# Patient Record
Sex: MALE | Race: BLACK OR AFRICAN AMERICAN | NOT HISPANIC OR LATINO
[De-identification: names, ages, dates, MRNs, and addresses within clinical notes are randomized per-mention and may not be internally consistent; named-entity substitution may affect disease eponyms.]

---

## 2020-12-21 ENCOUNTER — RESULT REVIEW (OUTPATIENT)
Age: 60
End: 2020-12-21

## 2020-12-21 ENCOUNTER — APPOINTMENT (OUTPATIENT)
Dept: NEUROSURGERY | Facility: CLINIC | Age: 60
End: 2020-12-21
Payer: MEDICARE

## 2020-12-21 ENCOUNTER — OUTPATIENT (OUTPATIENT)
Dept: OUTPATIENT SERVICES | Facility: HOSPITAL | Age: 60
LOS: 1 days | End: 2020-12-21
Payer: MEDICARE

## 2020-12-21 VITALS
HEART RATE: 80 BPM | TEMPERATURE: 97.3 F | WEIGHT: 183 LBS | SYSTOLIC BLOOD PRESSURE: 119 MMHG | OXYGEN SATURATION: 98 % | DIASTOLIC BLOOD PRESSURE: 77 MMHG | HEIGHT: 70 IN | BODY MASS INDEX: 26.2 KG/M2

## 2020-12-21 DIAGNOSIS — Z85.46 PERSONAL HISTORY OF MALIGNANT NEOPLASM OF PROSTATE: ICD-10-CM

## 2020-12-21 PROCEDURE — 72050 X-RAY EXAM NECK SPINE 4/5VWS: CPT

## 2020-12-21 PROCEDURE — 99072 ADDL SUPL MATRL&STAF TM PHE: CPT

## 2020-12-21 PROCEDURE — 72050 X-RAY EXAM NECK SPINE 4/5VWS: CPT | Mod: 26

## 2020-12-21 PROCEDURE — 99205 OFFICE O/P NEW HI 60 MIN: CPT

## 2020-12-22 PROBLEM — Z85.46 HISTORY OF MALIGNANT NEOPLASM OF PROSTATE: Status: RESOLVED | Noted: 2020-12-21 | Resolved: 2020-12-22

## 2020-12-22 NOTE — REVIEW OF SYSTEMS
[As Noted in HPI] : as noted in HPI [Lightheadedness] : lightheadedness [Vertigo] : vertigo [Migraine Headache] : migraine headaches [Feelings Of Weakness] : feelings of weakness [Negative] : Heme/Lymph

## 2020-12-22 NOTE — HISTORY OF PRESENT ILLNESS
[de-identified] : 60 year old man with past medical history prostate cancer referred by Dr. Yang for neurosurgical evaluation of cervical spine.\par \par Mr. Montez states that he has suffered from chronic neck pain for about 15 years. Over this time, he states his symptoms have worsened. Pain involves his neck radiating to bilateral extremities (LEFT greater than RIGHT). He also reports intermittent numbness, particularly in his hands bilaterally. He reports weakness in his LEFT hand.\par \par He states pain is most severe with flexion/extension and rotation of his neck. For these reasons, he finds it difficult to participate in specific activities. He states even using the elliptical machine while working out can cause neck discomfort. \par He has done physical therapy in the past several times without relief of symptoms. He has not seen pain management nor had injections in his neck.\par \par He also reports flexion/extension of his neck also can cause vertigo and lightheadedness.\par \par His last MRI cervical spine which he brings with him for review today was done 10/20/2019.\par \par He does report occasional urinary incontinence, which he states began after his prostate surgery. He denies imbalance, lower extremity weakness/numbness/tingling/pain.

## 2020-12-22 NOTE — DATA REVIEWED
[de-identified] : MRI cervical spine without contrast 10/2/19:\par Impression: Multilevel degenerative changes with up to mild to moderate central canal stenosis at C2-C4 secondary to a central disc protrusion that indents the cord. There is also mild central canal stenosis at C4-5, C5-6, C6-7. There is greater left sided foraminal stenosis at C3-4 and C4-5 and more symmetric foraminal stenosis inferiorly. These findings are stable.

## 2020-12-22 NOTE — PHYSICAL EXAM
[General Appearance - Alert] : alert [General Appearance - In No Acute Distress] : in no acute distress [Oriented To Time, Place, And Person] : oriented to person, place, and time [Motor Tone] : muscle tone was normal in all four extremities [Motor Strength] : muscle strength was normal in all four extremities [Pain] : was painful [Sclera] : the sclera and conjunctiva were normal [Outer Ear] : the ears and nose were normal in appearance [Neck Appearance] : the appearance of the neck was normal [Abnormal Walk] : normal gait [Skin Color & Pigmentation] : normal skin color and pigmentation

## 2021-01-08 ENCOUNTER — APPOINTMENT (OUTPATIENT)
Dept: PHYSICAL MEDICINE AND REHAB | Facility: CLINIC | Age: 61
End: 2021-01-08
Payer: MEDICARE

## 2021-01-08 VITALS — WEIGHT: 183 LBS | RESPIRATION RATE: 17 BRPM | BODY MASS INDEX: 26.2 KG/M2 | HEIGHT: 70 IN

## 2021-01-08 DIAGNOSIS — M62.81 MUSCLE WEAKNESS (GENERALIZED): ICD-10-CM

## 2021-01-08 DIAGNOSIS — R29.898 OTHER SYMPTOMS AND SIGNS INVOLVING THE MUSCULOSKELETAL SYSTEM: ICD-10-CM

## 2021-01-08 PROCEDURE — 99204 OFFICE O/P NEW MOD 45 MIN: CPT | Mod: 95

## 2021-01-08 NOTE — ASSESSMENT
[FreeTextEntry1] : \par PLAN AND RECOMMENDATIONS :\par \par We discussed differential diagnosis and clinical impression\par agree with EMG pre op planning rule out CTS etc \par \par Recommend\par .symptomatic care and support\par  medications as per pain MD \par  imaging -done \par  PT no use has never helped him \par  hydrotherapy /heat / cold for pain\par  continue  ergonomic precautions including pacing ,posture and frequent breaks while typing.\par \par  relative rest and avoidance of painful activity where possible \par  increasing activity as discussed \par  return for EMG\par Information given to patient about EMG and Nerve Conduction Study Examination including  planning, differential diagnosis to rule in /rule out ,duration of the test ,precautions (if patient on blood thinner.has bleeding disorder or  pace maker device etc -still possible to undergo with care), side effects(benign-limited to short term bruising and discomfort/pain)  \par The protocol of temp checks upon arrival ,disinfection procedure of waiting room and the lab explained- reassured. \par All questions answered. \par Patient instructed to book appointment upon conclusion of appointment\par \par Information sheet ' Answers to your Questions on EMG " forwarded to patient to read prior to testing, with further information about training,background and the procedure itself .\par \par I certify that > 50 % of time spent was spent on counselling and coordination of care and more than 50% face to face directly \par Time spent including review of documents /records/decision making /discussion  amounted  > 45 minutes\par

## 2021-01-08 NOTE — PHYSICAL EXAM
[Normal] : Oriented to person, place, and time, insight and judgement were intact and the affect was normal [de-identified] : RR 16  [de-identified] : no cane ROM c spine hurts in alll direction s

## 2021-01-08 NOTE — HISTORY OF PRESENT ILLNESS
[Home] : at home, [unfilled] , at the time of the visit. [Medical Office: ([facility])___] : at the medical office located in  [Verbal consent obtained from patient] : the patient, [unfilled] [FreeTextEntry1] : Mr. AURELIANO TURNER is a very pleasant 60 year male who is seen for evaluation of neck pain radiating to the both upper extremity that has been ongoing for 20 years  without any specific injury or inciting event. The pain is located primarily post neck  intermittent in nature and described as severe. The pain is rated as 6/10 during today's visit, and ranges from 3-7/10. The patient's symptoms are aggravated by movement   and alleviated by rest. The patient denies any numbness/tingling, weakness, or bowel/bladder dysfunction. The patient has no other complaints at this time.\par he had some numbness in hands in past as if arms fell asleep lasted for 2 months \par left hand weaker he says \par never had blocks \par PT never helped \par MRI stenosis C2-4 central disc and left foraminal stenosis \par \par had EMG 2019 [facility] helio nothing found \par he is retired  \par 911 claim for prostate cancer \par retired 2015 got cancer next year 2017 \par

## 2021-01-08 NOTE — REVIEW OF SYSTEMS
[Joint Pain] : joint pain [Joint Stiffness] : joint stiffness [Muscle Weakness] : muscle weakness [Negative] : Heme/Lymph [Fatigue] : no fatigue [Recent Change In Weight] : ~T no recent weight change

## 2021-03-10 ENCOUNTER — RESULT REVIEW (OUTPATIENT)
Age: 61
End: 2021-03-10

## 2021-03-10 ENCOUNTER — OUTPATIENT (OUTPATIENT)
Dept: OUTPATIENT SERVICES | Facility: HOSPITAL | Age: 61
LOS: 1 days | End: 2021-03-10
Payer: MEDICARE

## 2021-03-10 ENCOUNTER — APPOINTMENT (OUTPATIENT)
Dept: MRI IMAGING | Facility: HOSPITAL | Age: 61
End: 2021-03-10

## 2021-03-10 PROCEDURE — 72141 MRI NECK SPINE W/O DYE: CPT

## 2021-03-10 PROCEDURE — 72141 MRI NECK SPINE W/O DYE: CPT | Mod: 26,MH

## 2021-03-17 ENCOUNTER — APPOINTMENT (OUTPATIENT)
Dept: PHYSICAL MEDICINE AND REHAB | Facility: CLINIC | Age: 61
End: 2021-03-17
Payer: MEDICARE

## 2021-03-17 VITALS
WEIGHT: 183 LBS | RESPIRATION RATE: 18 BRPM | HEIGHT: 70 IN | HEART RATE: 74 BPM | TEMPERATURE: 97.9 F | DIASTOLIC BLOOD PRESSURE: 81 MMHG | BODY MASS INDEX: 26.2 KG/M2 | SYSTOLIC BLOOD PRESSURE: 118 MMHG

## 2021-03-17 PROCEDURE — 95886 MUSC TEST DONE W/N TEST COMP: CPT

## 2021-03-17 PROCEDURE — 95911 NRV CNDJ TEST 9-10 STUDIES: CPT

## 2021-03-22 ENCOUNTER — APPOINTMENT (OUTPATIENT)
Dept: NEUROSURGERY | Facility: CLINIC | Age: 61
End: 2021-03-22
Payer: MEDICARE

## 2021-03-22 VITALS
OXYGEN SATURATION: 98 % | TEMPERATURE: 96.4 F | HEART RATE: 76 BPM | HEIGHT: 70 IN | DIASTOLIC BLOOD PRESSURE: 72 MMHG | BODY MASS INDEX: 26.2 KG/M2 | SYSTOLIC BLOOD PRESSURE: 108 MMHG | WEIGHT: 183 LBS | RESPIRATION RATE: 14 BRPM

## 2021-03-22 DIAGNOSIS — Z01.818 ENCOUNTER FOR OTHER PREPROCEDURAL EXAMINATION: ICD-10-CM

## 2021-03-22 DIAGNOSIS — Z80.0 FAMILY HISTORY OF MALIGNANT NEOPLASM OF DIGESTIVE ORGANS: ICD-10-CM

## 2021-03-22 PROCEDURE — 99215 OFFICE O/P EST HI 40 MIN: CPT

## 2021-03-22 NOTE — ADDENDUM
[FreeTextEntry1] : 2021 \par  \par OFFICE FOLLOWUP NOTE \par  \par  \par Re:	Braeden Montez  \par :	60 \par MRN:  [id number] \par  \par Mr. Montez is a 60-year-old man who presents with a history of chronic neck pain, as well as intermittent radicular pain bilaterally. He has been dealing with these issues chronically for many years. At this time he indicates that his neck pain has gotten worse, and it has been a significant detriment to his quality of life. He has attempted physical therapy, as well as conservative pain management strategies without any significant relief. He also indicates that at one point he self-medicated with alcohol many years ago, due to the discomfort in his neck, as well as the radicular pain.  \par  \par I sent Mr. Montez for followup MRI cervical spine which does show large disc herniations at C3-4 and C4-5 which both indent the ventral spinal cord causing local mass effect. At C3-4 the disc herniation is most prominent centrally. He also has bilateral, moderate to severe foraminal stenosis at that level. At the C4-5 level he has a right eccentric disc herniation which impinges on the cord, as well as moderate stenosis of the right foramen and moderate to severe stenosis of the left foramen. I also note that he has diffuse spondylosis throughout the cervical spine with multilevel foraminal stenosis, however the worst levels appears to be C3-4 and C4-5. I also sent Mr. Montez for cervical flexion/extension x-rays which does demonstrate evidence of instability with retrolisthesis of C3-4 and C4-5 on extension. EMG was also performed showing multilevel cervical radiculopathy. \par  \par Mr. Montez does note that the neck discomfort is most severe with movement. I discussed with him that he certainly has pathology in the cervical spine that could account for his radicular pain, and neck pain. He has symptomatic cervical stenosis and instability at the C3-4 and C4-5 levels. I indicated that cervical discectomy and instrumented fusion at these levels is one option for dealing with his discomfort, given that he has tried and failed more conservative measures. I discussed disc arthroplasty but indicated that it is not my preferred strategy for two-level disease. I encouraged him to consider continued conservative management as he has already been dealing with this for some time but he favors definitive operative management at this time. Risks of anterior cervical discectomy and instrumented fusion including but not limited to infection, need for reoperation, adjacent segment disease, dysphagia necessitating feeding tube, vascular injury, worsening neck pain, and instrumentation failure were all discussed. Mr. Montez understands and wishes to proceed with surgery. We will plan for anterior cervical discectomy and fusion at C3-4 and C4-5. I’ll use an “all-in-one” interbody strut with secondary option being strut with anterior plate if necessary. \par  \par  \par  \par  \par Mele Richards M.D. \par  of Neurosurgery \par [facility] \[facility] Department of Neurosurgery \par [facility] \par [address] \par [facility], [address] Floor \[place] [address] \par Tel: [phone] \par Email:  [email or web address] \par  \par Dictated but not read. \par  \par SOLOMON/[facility] #[id number] \par  \par  \par

## 2021-03-22 NOTE — PHYSICAL EXAM
[General Appearance - Alert] : alert [General Appearance - In No Acute Distress] : in no acute distress [Oriented To Time, Place, And Person] : oriented to person, place, and time [Motor Tone] : muscle tone was normal in all four extremities [Motor Strength] : muscle strength was normal in all four extremities [Pain] : was painful [Normal] : normal [Able to toe walk] : the patient was able to toe walk [Able to heel walk] : the patient was able to heel walk [Sclera] : the sclera and conjunctiva were normal [Outer Ear] : the ears and nose were normal in appearance [Neck Appearance] : the appearance of the neck was normal [] : no respiratory distress [Abnormal Walk] : normal gait [Skin Color & Pigmentation] : normal skin color and pigmentation

## 2021-03-22 NOTE — DATA REVIEWED
[de-identified] : \par  MR Cervical Spine No Cont             Final\par \par No Documents Attached\par \par \par \par \par   EXAM:  MR SPINE CERVICAL\par \par PROCEDURE DATE:  03/10/2021\par \par \par \par INTERPRETATION:  PROCEDURE: MRI cervical spine without contrast\par \par INDICATION: Neck pain; cervical radiculopathy; degenerative cervical stenosis\par \par TECHNIQUE: Sagittal T1, T2, STIR and axial T1, T2 and gradient echo images of the cervical spine were obtained.\par \par COMPARISON: None\par \par CORRELATION: Cervical x-rays 12/21/2020\par \par FINDINGS: The MRI exam demonstrates loss of the normal cervical lordosis which may be secondary to positioning. There is loss of intervertebral disc space height at the C3/4 through the C7/T1 levels. There is anterior osteophytic lipping at C4/5 through C7/T1. The vertebral body heights are maintained. The vertebral body marrow signal is appropriate for the patient's stated age. No abnormal signal is identified within the cervical cord. The prevertebral soft tissues are within normal limits. The cerebellar tonsils are normal in position.\par \par At the C2/3 level, there is no disc herniation. There are degenerative changes involving the left facet. There is no central spinal canal stenosis or neural foramen narrowing.\par \par At the C3/4 level, there is a large central disc herniation indenting the thecal sac and compressing the spinal cord. There is bilateral uncovertebral joint disease. There is severe bilateral neural foramen narrowing.\par \par At the C4/5 level, there is moderate to large right paracentral disc herniation indenting the thecal sac and compressing the spinal cord. There is bilateral uncovertebral joint and left degenerative facet disease. There is moderate right and severe left neural foramen narrowing.\par \par At the C5/6 level, there is disc bulge indenting the thecal sac.  There is bilateral uncovertebral joint disease. There is no central spinal canal stenosis. There is severe bilateral neural foramen narrowing.\par \par At the C6/7 level, there is disc bulge eccentric to the left. There is bilateral uncovertebral joint disease. There is no spinal canal stenosis. There is moderate bilateral neural foramen narrowing.\par \par At the C7/T1 level, there is diffuse disc bulge. There is no central spinal canal stenosis. There is bilateral uncovertebral joint disease There is severe bilateral foramen narrowing.\par \par IMPRESSION: Multilevel degenerative disc disease with large central disc herniation at C4/5 and moderate-sized right paracentral disc herniation at C4/5 compressing the spinal cord.\par \par \par \par \par Thank you for the opportunity to participate in the care of this patient.\par \par \par MIKEY SOLANO MD; Attending Radiologist\par This document has been electronically signed. Mar 11 2021  3:05PM\par \par  \par \par  Ordered by: GALO STACY       Collected/Examined: 10Mar2021 04:04PM       \par Verification Required       Stage: Final       \par  Performed at: [facility]       Resulted: 11Mar2021 02:20PM       Last Updated: 11Mar2021 03:08PM       Accession: [id number]

## 2021-03-22 NOTE — REASON FOR VISIT
[Follow-Up: _____] : a [unfilled] follow-up visit [FreeTextEntry1] : \par He returns today with MRI cervical spine done on 3/10/21, which demonstrates large central disc herniation at C4/5 and moderate sized right paracentral disc herniation at C4/5 compressing the spinal cord. \par \par EMG done on 3/17/21, demonstrates cervical radiculopathy.\par \par He returns to review ordered studies and finalize treatment plan. \par \par Continues to report intermittent severe neck pain and bilateral radiating arm pain (LEFT greater than RIGHT),

## 2021-03-22 NOTE — ASSESSMENT
[FreeTextEntry1] : MRI cervical spine and EMG results reviewed by Dr. Richards with patient in detail, which demonstrates central disc herniation C3-4, C4-5. Cervical x-rays demonstrate retrolisthesis C3-4 and C4-5.\par Surgery recommended, ACDF C3-4, C4-5\par Discussed goal of surgery is to HALT progression of symptoms.\par Risks, benefits and alternatives to surgery discussed. \par Multiple questions answered to patient’s satisfaction. \par Risks include but is not limited to infection, no resolution of symptoms/device failure\par Education provided regarding pre-operative clearance requirements\par PST packet reviewed with and given to patient\par Education provided regarding use of chlorhexidine wipes pre-op\par \par Patient understands and wishes to proceed with planned surgery. \par \par

## 2021-03-22 NOTE — HISTORY OF PRESENT ILLNESS
[de-identified] : 60 year old man with past medical history prostate cancer referred by Dr. Yang for neurosurgical evaluation of cervical spine.\par \par Mr. Montez states that he has suffered from chronic neck pain for about 15 years. Over this time, he states his symptoms have worsened. Pain involves his neck radiating to bilateral extremities (LEFT greater than RIGHT). He also reports intermittent numbness, particularly in his hands bilaterally. He reports weakness in his LEFT hand.\par \par He states pain is most severe with flexion/extension and rotation of his neck. For these reasons, he finds it difficult to participate in specific activities. He states even using the elliptical machine while working out can cause neck discomfort. \par He has done physical therapy in the past several times without relief of symptoms. He has not seen pain management nor had injections in his neck.\par \par He also reports flexion/extension of his neck also can cause vertigo and lightheadedness.\par \par His last MRI cervical spine which he brings with him for review today was done 10/20/2019.\par \par He does report occasional urinary incontinence, which he states began after his prostate surgery. He denies imbalance, lower extremity weakness/numbness/tingling/pain.

## 2021-04-26 ENCOUNTER — OUTPATIENT (OUTPATIENT)
Dept: OUTPATIENT SERVICES | Facility: HOSPITAL | Age: 61
LOS: 1 days | End: 2021-04-26
Payer: MEDICARE

## 2021-04-26 ENCOUNTER — RESULT REVIEW (OUTPATIENT)
Age: 61
End: 2021-04-26

## 2021-04-26 ENCOUNTER — APPOINTMENT (OUTPATIENT)
Dept: CT IMAGING | Facility: HOSPITAL | Age: 61
End: 2021-04-26
Payer: MEDICARE

## 2021-04-26 PROCEDURE — G1004: CPT

## 2021-04-26 PROCEDURE — 72125 CT NECK SPINE W/O DYE: CPT

## 2021-04-26 PROCEDURE — 72125 CT NECK SPINE W/O DYE: CPT | Mod: 26,ME

## 2021-06-11 ENCOUNTER — APPOINTMENT (OUTPATIENT)
Dept: NEUROSURGERY | Facility: CLINIC | Age: 61
End: 2021-06-11
Payer: MEDICARE

## 2021-06-11 DIAGNOSIS — R20.0 ANESTHESIA OF SKIN: ICD-10-CM

## 2021-06-11 DIAGNOSIS — M50.20 OTHER CERVICAL DISC DISPLACEMENT, UNSPECIFIED CERVICAL REGION: ICD-10-CM

## 2021-06-11 PROCEDURE — 99214 OFFICE O/P EST MOD 30 MIN: CPT | Mod: 95

## 2021-06-11 NOTE — ASSESSMENT
[FreeTextEntry1] : MRI cervical spine, CT cervical spine and EMG results reviewed by Dr. Richards with patient in detail, which demonstrates central disc herniation C3-4, C4-5. Cervical x-rays demonstrate retrolisthesis C3-4 and C4-5.\par Surgery recommended, ACDF C3-4, C4-5\par Discussed goal of surgery is to HALT progression of symptoms.\par Risks, benefits and alternatives to surgery discussed. \par Multiple questions answered to patient’s satisfaction. \par Risks include but is not limited to infection, no resolution of symptoms/device failure\par \par Education provided regarding use of chlorhexidine wipes pre-op\par \par Patient understands and wishes to proceed with planned surgery. \par \par I, Dr. Richards, personally performed the evaluation and management (E/M) services for this established patient who presents today with (a) new problem(s)/exacerbation of (an) existing condition(s).  That E/M includes conducting the examination, assessing all new/exacerbated conditions, and establishing a new plan of care.  Today, my ACP, Ching Jean, was here to observe my evaluation and management services for this new problem/exacerbated condition to be followed going forward.\par \par \par -------------------------------------------------\par Number and Complexity of Problems: Moderate - Worsening neck pain, Cervical Radiculopathy d/t cervical stenosis\par \par Amount/Complexity of Data Reviewed/Analyzed: Moderate - Dr. Richards independently reviewed and interpreted MRI, CT cervical spine and cervical x-rays\par \par Risk of Complications and/or Morbidity or Mortality of Patient Management: Moderate - Dr. Richards recommends ACDF C3-4,C4-C5 due to worsening radiculopathy and LEFT ahdn weakness\par \par \par

## 2021-06-11 NOTE — DATA REVIEWED
[de-identified] : \par  CT Cervical Spine No Cont             Final\par \par No Documents Attached\par \par \par \par \par   EXAM:  CT CERVICAL SPINE\par \par PROCEDURE DATE:  04/26/2021\par \par \par \par INTERPRETATION:  EXAM: CT cervical spine without contrast\par \par INDICATION: Cervical spinal stenosis\par \par TECHNIQUE: CT exam cervical spine without contrast. Multiple axial images obtained from mid orbits to the sternoclavicular joint. Sagittal/coronal reformatted images obtained from axial data set. Images reviewed in soft tissue and bone windows.\par \par COMPARISON: March 10, 2021 MR cervical spine. December 21, 2020 cervical spine radiograph\par \par FINDINGS:\par \par Cervical lordosis maintained Cervical vertebral body heights are maintained. No significant spondylolisthesis. Cervical spinous processes are intact. Articular pillars of cervical spine are intact. No jumped or perched facets identified. Arch of C1 and odontoid process of C2 appear to be intact. No acute fracture identified. Atlantooccipital and atlantodental articulations are within normal limits. The atlantodental interval within normal limits there are mild multilevel degenerative endplate changes with anterior osteophytes and endplate irregularity. There is mild intervertebral disc space narrowing. Paravertebral soft tissues are within normal limits. Visualized lung apices are unremarkable. No evidence for ossification of posterior longitudinal ligament.\par \par There are multilevel findings as follows with comparison made to March 10, 2021 MR cervical spine:\par \par Craniocervical junction unremarkable.\par \par C2-C3: Left uncovertebral/facet hypertrophy without significant canal or foraminal stenosis, unchanged.\par \par C3-C4: Disc osteophyte complex and bilateral uncovertebral/facet hypertrophy contributing to mild canal stenosis and moderate to severe bilateral foraminal stenosis, unchanged.\par \par C4-C5: Large right paracentral osteophyte complex and bilateral uncovertebral/facet hypertrophy contributing to moderate canal stenosis and moderate severe bilateral foraminal stenosis, left greater than right. No significant interval change.\par \par C5-C6:Disc osteophyte complex and bilateral uncovertebral/facet hypertrophy contributing to no significant canal stenosis and moderate to severe bilateral foraminal stenosis. Unchanged.\par \par C6-C7: Disc osteophyte complex and bilateral uncovertebral/facet hypertrophy contributing to no significant canal stenosis and moderate bilateral foraminal stenosis.\par \par C7-T1: Disc osteophyte complex and bilateral uncovertebral/facet hypertrophy contributing to no significant canal stenosis and moderate to severe bilateral foraminal stenosis, unchanged.\par \par \par IMPRESSION:\par \par Multilevel cervical spondylitic changes as described above notable for moderate canal stenosis at C4-C5, moderate to severe bilateral foraminal stenosis at C3-C4, C4-C5, C5-C6, C6-C7, C7-T1 without significant change from the March 2021 MR.\par \par \par \par \par \par Thank you for the opportunity to participate in the care of this patient.\par \par \par HANANE AREVALO MD, ATTENDING RADIOLOGIST\par This document has been electronically signed. Apr 27 2021 12:13PM\par \par  \par \par  Ordered by: GALO STACY       Collected/Examined: 26Apr2021 09:38AM       \par Verification Required       Stage: Final       \par  Performed at: [facility]       Resulted: 27Apr2021 11:57AM       Last Updated: 27Apr2021 12:16PM       Accession: [id number]        [de-identified] : \par  MR Cervical Spine No Cont             Final\par \par No Documents Attached\par \par \par \par \par   EXAM:  MR SPINE CERVICAL\par \par PROCEDURE DATE:  03/10/2021\par \par \par \par INTERPRETATION:  PROCEDURE: MRI cervical spine without contrast\par \par INDICATION: Neck pain; cervical radiculopathy; degenerative cervical stenosis\par \par TECHNIQUE: Sagittal T1, T2, STIR and axial T1, T2 and gradient echo images of the cervical spine were obtained.\par \par COMPARISON: None\par \par CORRELATION: Cervical x-rays 12/21/2020\par \par FINDINGS: The MRI exam demonstrates loss of the normal cervical lordosis which may be secondary to positioning. There is loss of intervertebral disc space height at the C3/4 through the C7/T1 levels. There is anterior osteophytic lipping at C4/5 through C7/T1. The vertebral body heights are maintained. The vertebral body marrow signal is appropriate for the patient's stated age. No abnormal signal is identified within the cervical cord. The prevertebral soft tissues are within normal limits. The cerebellar tonsils are normal in position.\par \par At the C2/3 level, there is no disc herniation. There are degenerative changes involving the left facet. There is no central spinal canal stenosis or neural foramen narrowing.\par \par At the C3/4 level, there is a large central disc herniation indenting the thecal sac and compressing the spinal cord. There is bilateral uncovertebral joint disease. There is severe bilateral neural foramen narrowing.\par \par At the C4/5 level, there is moderate to large right paracentral disc herniation indenting the thecal sac and compressing the spinal cord. There is bilateral uncovertebral joint and left degenerative facet disease. There is moderate right and severe left neural foramen narrowing.\par \par At the C5/6 level, there is disc bulge indenting the thecal sac.  There is bilateral uncovertebral joint disease. There is no central spinal canal stenosis. There is severe bilateral neural foramen narrowing.\par \par At the C6/7 level, there is disc bulge eccentric to the left. There is bilateral uncovertebral joint disease. There is no spinal canal stenosis. There is moderate bilateral neural foramen narrowing.\par \par At the C7/T1 level, there is diffuse disc bulge. There is no central spinal canal stenosis. There is bilateral uncovertebral joint disease There is severe bilateral foramen narrowing.\par \par IMPRESSION: Multilevel degenerative disc disease with large central disc herniation at C4/5 and moderate-sized right paracentral disc herniation at C4/5 compressing the spinal cord.\par \par \par \par \par Thank you for the opportunity to participate in the care of this patient.\par \par \par MIKEY SOLANO MD; Attending Radiologist\par This document has been electronically signed. Mar 11 2021  3:05PM\par \par  \par \par  Ordered by: GALO STACY       Collected/Examined: 10Mar2021 04:04PM       \par Verification Required       Stage: Final       \par  Performed at: [facility]       Resulted: 11Mar2021 02:20PM       Last Updated: 11Mar2021 03:08PM       Accession: [id number]        [de-identified] : \par  Xray Cervical Spine 4 or 5 Views             Final\par \par No Documents Attached\par \par \par \par \par   EXAM:  XR C-SPINE 4 OR 5 VIEWS\par \par PROCEDURE DATE:  12/21/2020\par \par \par \par INTERPRETATION:  Clinical history/reason for exam:Neck pain\par \par Comparison: None.\par \par Procedure: 4 views of the cervical spine with flexion and extension\par \par Findings:\par There is reversal of the normal cervical lordosis. There is 2 mm retrolisthesis C3 on C4 and trace retrolisthesis C4 on C5 on extension which reduces on flexion. The vertebral body heights are maintained. There is multilevel disc space narrowing which is most prominent at C4-5 through C6-7 with anterior osteophytes. There is posterior osseous ridging at C5-6. The prevertebral soft tissues are not thickened.\par \par Impression:\par Multilevel cervical spondylosis as above. Retrolisthesis C3-4 and C4-5 on extension which reduces on flexion.\par \par \par \par \par Thank you for the opportunity to participate in the care of this patient.\par \par \par FANNY PENDLETON MD; Attending Radiologist\par This document has been electronically signed. Dec 21 2020  1:56PM\par \par  \par \par  Ordered by: GALO STACY       Collected/Examined: [id number] 01:53PM       \par Verification Required       Stage: Final       \par  Performed at: [facility]       Resulted: [id number] 01:59PM       Last Updated: 21Dec2020 01:59PM       Accession: [id number]

## 2021-06-11 NOTE — HISTORY OF PRESENT ILLNESS
[Home] : at home, [unfilled] , at the time of the visit. [Medical Office: ([facility])___] : at the medical office located in  [Verbal consent obtained from patient] : the patient, [unfilled] [de-identified] : 60 year old man with past medical history prostate cancer referred by Dr. Yang for neurosurgical evaluation of cervical spine.\par \par Mr. Montez states that he has suffered from chronic neck pain for about 15 years. Over this time, he states his symptoms have worsened. Pain involves his neck radiating to bilateral extremities (LEFT greater than RIGHT). He also reports intermittent numbness, particularly in his hands bilaterally. He reports weakness in his LEFT hand.\par \par He states pain is most severe with flexion/extension and rotation of his neck. For these reasons, he finds it difficult to participate in specific activities. He states even using the elliptical machine while working out can cause neck discomfort. \par He has done physical therapy in the past several times without relief of symptoms. He has not seen pain management nor had injections in his neck.\par \par He also reports flexion/extension of his neck also can cause vertigo and lightheadedness.\par \par He returned to the office on 3/22/21 with MRI cervical spine done on 3/10/21, which demonstrates large central disc herniation at C4/5 and moderate sized right paracentral disc herniation at C4/5 compressing the spinal cord.  EMG done on 3/17/21, demonstrates cervical radiculopathy.\par \mariela Continues to report intermittent severe neck pain and bilateral radiating arm pain (LEFT greater than RIGHT). \par He was recommended ACDF C3-4,C4-5. \par \par He comes to the office today to review surgical recommendations.

## 2021-06-15 ENCOUNTER — TRANSCRIPTION ENCOUNTER (OUTPATIENT)
Age: 61
End: 2021-06-15

## 2021-06-15 VITALS
HEART RATE: 76 BPM | WEIGHT: 181.22 LBS | TEMPERATURE: 98 F | OXYGEN SATURATION: 97 % | HEIGHT: 70 IN | SYSTOLIC BLOOD PRESSURE: 110 MMHG | RESPIRATION RATE: 16 BRPM | DIASTOLIC BLOOD PRESSURE: 73 MMHG

## 2021-06-16 ENCOUNTER — INPATIENT (INPATIENT)
Facility: HOSPITAL | Age: 61
LOS: 1 days | Discharge: HOME | DRG: 472 | End: 2021-06-18
Attending: NEUROLOGICAL SURGERY | Admitting: NEUROLOGICAL SURGERY
Payer: MEDICARE

## 2021-06-16 ENCOUNTER — APPOINTMENT (OUTPATIENT)
Dept: NEUROSURGERY | Facility: HOSPITAL | Age: 61
End: 2021-06-16

## 2021-06-16 DIAGNOSIS — M54.12 RADICULOPATHY, CERVICAL REGION: ICD-10-CM

## 2021-06-16 DIAGNOSIS — Z90.79 ACQUIRED ABSENCE OF OTHER GENITAL ORGAN(S): Chronic | ICD-10-CM

## 2021-06-16 DIAGNOSIS — R42 DIZZINESS AND GIDDINESS: ICD-10-CM

## 2021-06-16 DIAGNOSIS — E78.5 HYPERLIPIDEMIA, UNSPECIFIED: ICD-10-CM

## 2021-06-16 DIAGNOSIS — Z98.890 OTHER SPECIFIED POSTPROCEDURAL STATES: Chronic | ICD-10-CM

## 2021-06-16 DIAGNOSIS — C61 MALIGNANT NEOPLASM OF PROSTATE: ICD-10-CM

## 2021-06-16 DIAGNOSIS — K21.9 GASTRO-ESOPHAGEAL REFLUX DISEASE WITHOUT ESOPHAGITIS: ICD-10-CM

## 2021-06-16 DIAGNOSIS — Z90.89 ACQUIRED ABSENCE OF OTHER ORGANS: Chronic | ICD-10-CM

## 2021-06-16 LAB
ANION GAP SERPL CALC-SCNC: 11 MMOL/L — SIGNIFICANT CHANGE UP (ref 5–17)
BASOPHILS # BLD AUTO: 0.02 K/UL — SIGNIFICANT CHANGE UP (ref 0–0.2)
BASOPHILS NFR BLD AUTO: 0.2 % — SIGNIFICANT CHANGE UP (ref 0–2)
BLD GP AB SCN SERPL QL: NEGATIVE — SIGNIFICANT CHANGE UP
BUN SERPL-MCNC: 14 MG/DL — SIGNIFICANT CHANGE UP (ref 7–23)
CALCIUM SERPL-MCNC: 8.6 MG/DL — SIGNIFICANT CHANGE UP (ref 8.4–10.5)
CHLORIDE SERPL-SCNC: 108 MMOL/L — SIGNIFICANT CHANGE UP (ref 96–108)
CO2 SERPL-SCNC: 24 MMOL/L — SIGNIFICANT CHANGE UP (ref 22–31)
CREAT SERPL-MCNC: 0.94 MG/DL — SIGNIFICANT CHANGE UP (ref 0.5–1.3)
EOSINOPHIL # BLD AUTO: 0 K/UL — SIGNIFICANT CHANGE UP (ref 0–0.5)
EOSINOPHIL NFR BLD AUTO: 0 % — SIGNIFICANT CHANGE UP (ref 0–6)
GLUCOSE SERPL-MCNC: 168 MG/DL — HIGH (ref 70–99)
HCT VFR BLD CALC: 39.2 % — SIGNIFICANT CHANGE UP (ref 39–50)
HGB BLD-MCNC: 13.1 G/DL — SIGNIFICANT CHANGE UP (ref 13–17)
IMM GRANULOCYTES NFR BLD AUTO: 0.3 % — SIGNIFICANT CHANGE UP (ref 0–1.5)
LYMPHOCYTES # BLD AUTO: 0.55 K/UL — LOW (ref 1–3.3)
LYMPHOCYTES # BLD AUTO: 5.8 % — LOW (ref 13–44)
MCHC RBC-ENTMCNC: 30.9 PG — SIGNIFICANT CHANGE UP (ref 27–34)
MCHC RBC-ENTMCNC: 33.4 GM/DL — SIGNIFICANT CHANGE UP (ref 32–36)
MCV RBC AUTO: 92.5 FL — SIGNIFICANT CHANGE UP (ref 80–100)
MONOCYTES # BLD AUTO: 0.09 K/UL — SIGNIFICANT CHANGE UP (ref 0–0.9)
MONOCYTES NFR BLD AUTO: 1 % — LOW (ref 2–14)
NEUTROPHILS # BLD AUTO: 8.74 K/UL — HIGH (ref 1.8–7.4)
NEUTROPHILS NFR BLD AUTO: 92.7 % — HIGH (ref 43–77)
NRBC # BLD: 0 /100 WBCS — SIGNIFICANT CHANGE UP (ref 0–0)
PLATELET # BLD AUTO: 138 K/UL — LOW (ref 150–400)
POTASSIUM SERPL-MCNC: 4.4 MMOL/L — SIGNIFICANT CHANGE UP (ref 3.5–5.3)
POTASSIUM SERPL-SCNC: 4.4 MMOL/L — SIGNIFICANT CHANGE UP (ref 3.5–5.3)
RBC # BLD: 4.24 M/UL — SIGNIFICANT CHANGE UP (ref 4.2–5.8)
RBC # FLD: 12.1 % — SIGNIFICANT CHANGE UP (ref 10.3–14.5)
RH IG SCN BLD-IMP: POSITIVE — SIGNIFICANT CHANGE UP
SODIUM SERPL-SCNC: 143 MMOL/L — SIGNIFICANT CHANGE UP (ref 135–145)
WBC # BLD: 9.43 K/UL — SIGNIFICANT CHANGE UP (ref 3.8–10.5)
WBC # FLD AUTO: 9.43 K/UL — SIGNIFICANT CHANGE UP (ref 3.8–10.5)

## 2021-06-16 PROCEDURE — 22552 ARTHRD ANT NTRBD CERVICAL EA: CPT

## 2021-06-16 PROCEDURE — 22853 INSJ BIOMECHANICAL DEVICE: CPT

## 2021-06-16 PROCEDURE — 22551 ARTHRD ANT NTRBDY CERVICAL: CPT

## 2021-06-16 RX ORDER — SENNA PLUS 8.6 MG/1
2 TABLET ORAL AT BEDTIME
Refills: 0 | Status: DISCONTINUED | OUTPATIENT
Start: 2021-06-16 | End: 2021-06-18

## 2021-06-16 RX ORDER — HYDROMORPHONE HYDROCHLORIDE 2 MG/ML
0.5 INJECTION INTRAMUSCULAR; INTRAVENOUS; SUBCUTANEOUS ONCE
Refills: 0 | Status: DISCONTINUED | OUTPATIENT
Start: 2021-06-16 | End: 2021-06-16

## 2021-06-16 RX ORDER — DEXAMETHASONE 0.5 MG/5ML
ELIXIR ORAL
Refills: 0 | Status: DISCONTINUED | OUTPATIENT
Start: 2021-06-16 | End: 2021-06-18

## 2021-06-16 RX ORDER — DEXTROSE 50 % IN WATER 50 %
25 SYRINGE (ML) INTRAVENOUS ONCE
Refills: 0 | Status: DISCONTINUED | OUTPATIENT
Start: 2021-06-16 | End: 2021-06-18

## 2021-06-16 RX ORDER — OXYCODONE HYDROCHLORIDE 5 MG/1
10 TABLET ORAL EVERY 6 HOURS
Refills: 0 | Status: DISCONTINUED | OUTPATIENT
Start: 2021-06-16 | End: 2021-06-18

## 2021-06-16 RX ORDER — CEFAZOLIN SODIUM 1 G
2000 VIAL (EA) INJECTION EVERY 8 HOURS
Refills: 0 | Status: DISCONTINUED | OUTPATIENT
Start: 2021-06-16 | End: 2021-06-16

## 2021-06-16 RX ORDER — POVIDONE-IODINE 5 %
1 AEROSOL (ML) TOPICAL ONCE
Refills: 0 | Status: COMPLETED | OUTPATIENT
Start: 2021-06-16 | End: 2021-06-16

## 2021-06-16 RX ORDER — DEXTROSE 50 % IN WATER 50 %
12.5 SYRINGE (ML) INTRAVENOUS ONCE
Refills: 0 | Status: DISCONTINUED | OUTPATIENT
Start: 2021-06-16 | End: 2021-06-18

## 2021-06-16 RX ORDER — ONDANSETRON 8 MG/1
4 TABLET, FILM COATED ORAL EVERY 6 HOURS
Refills: 0 | Status: DISCONTINUED | OUTPATIENT
Start: 2021-06-16 | End: 2021-06-18

## 2021-06-16 RX ORDER — DEXAMETHASONE 0.5 MG/5ML
2 ELIXIR ORAL EVERY 6 HOURS
Refills: 0 | Status: COMPLETED | OUTPATIENT
Start: 2021-06-17 | End: 2021-06-18

## 2021-06-16 RX ORDER — ONDANSETRON 8 MG/1
4 TABLET, FILM COATED ORAL EVERY 6 HOURS
Refills: 0 | Status: DISCONTINUED | OUTPATIENT
Start: 2021-06-16 | End: 2021-06-16

## 2021-06-16 RX ORDER — ACETAMINOPHEN 500 MG
1000 TABLET ORAL ONCE
Refills: 0 | Status: COMPLETED | OUTPATIENT
Start: 2021-06-16 | End: 2021-06-16

## 2021-06-16 RX ORDER — ACETAMINOPHEN 500 MG
650 TABLET ORAL EVERY 6 HOURS
Refills: 0 | Status: DISCONTINUED | OUTPATIENT
Start: 2021-06-16 | End: 2021-06-18

## 2021-06-16 RX ORDER — GABAPENTIN 400 MG/1
300 CAPSULE ORAL ONCE
Refills: 0 | Status: COMPLETED | OUTPATIENT
Start: 2021-06-16 | End: 2021-06-16

## 2021-06-16 RX ORDER — SODIUM CHLORIDE 9 MG/ML
1000 INJECTION, SOLUTION INTRAVENOUS
Refills: 0 | Status: DISCONTINUED | OUTPATIENT
Start: 2021-06-16 | End: 2021-06-18

## 2021-06-16 RX ORDER — OXYCODONE HYDROCHLORIDE 5 MG/1
5 TABLET ORAL EVERY 4 HOURS
Refills: 0 | Status: DISCONTINUED | OUTPATIENT
Start: 2021-06-16 | End: 2021-06-18

## 2021-06-16 RX ORDER — HYDROMORPHONE HYDROCHLORIDE 2 MG/ML
0.5 INJECTION INTRAMUSCULAR; INTRAVENOUS; SUBCUTANEOUS EVERY 6 HOURS
Refills: 0 | Status: DISCONTINUED | OUTPATIENT
Start: 2021-06-16 | End: 2021-06-17

## 2021-06-16 RX ORDER — METHOCARBAMOL 500 MG/1
500 TABLET, FILM COATED ORAL EVERY 8 HOURS
Refills: 0 | Status: DISCONTINUED | OUTPATIENT
Start: 2021-06-16 | End: 2021-06-18

## 2021-06-16 RX ORDER — CELECOXIB 200 MG/1
200 CAPSULE ORAL ONCE
Refills: 0 | Status: COMPLETED | OUTPATIENT
Start: 2021-06-16 | End: 2021-06-16

## 2021-06-16 RX ORDER — DEXAMETHASONE 0.5 MG/5ML
1 ELIXIR ORAL EVERY 6 HOURS
Refills: 0 | Status: DISCONTINUED | OUTPATIENT
Start: 2021-06-18 | End: 2021-06-18

## 2021-06-16 RX ORDER — CHLORHEXIDINE GLUCONATE 213 G/1000ML
1 SOLUTION TOPICAL ONCE
Refills: 0 | Status: COMPLETED | OUTPATIENT
Start: 2021-06-16 | End: 2021-06-16

## 2021-06-16 RX ORDER — PANTOPRAZOLE SODIUM 20 MG/1
40 TABLET, DELAYED RELEASE ORAL
Refills: 0 | Status: DISCONTINUED | OUTPATIENT
Start: 2021-06-16 | End: 2021-06-18

## 2021-06-16 RX ORDER — CEFAZOLIN SODIUM 1 G
2000 VIAL (EA) INJECTION EVERY 8 HOURS
Refills: 0 | Status: COMPLETED | OUTPATIENT
Start: 2021-06-16 | End: 2021-06-17

## 2021-06-16 RX ORDER — OMEPRAZOLE 10 MG/1
1 CAPSULE, DELAYED RELEASE ORAL
Qty: 0 | Refills: 0 | DISCHARGE

## 2021-06-16 RX ORDER — DEXAMETHASONE 0.5 MG/5ML
4 ELIXIR ORAL EVERY 6 HOURS
Refills: 0 | Status: COMPLETED | OUTPATIENT
Start: 2021-06-16 | End: 2021-06-17

## 2021-06-16 RX ORDER — SODIUM CHLORIDE 9 MG/ML
1000 INJECTION INTRAMUSCULAR; INTRAVENOUS; SUBCUTANEOUS
Refills: 0 | Status: DISCONTINUED | OUTPATIENT
Start: 2021-06-16 | End: 2021-06-17

## 2021-06-16 RX ORDER — FAMOTIDINE 10 MG/ML
20 INJECTION INTRAVENOUS EVERY 12 HOURS
Refills: 0 | Status: DISCONTINUED | OUTPATIENT
Start: 2021-06-16 | End: 2021-06-16

## 2021-06-16 RX ORDER — FAMOTIDINE 10 MG/ML
20 INJECTION INTRAVENOUS DAILY
Refills: 0 | Status: DISCONTINUED | OUTPATIENT
Start: 2021-06-16 | End: 2021-06-18

## 2021-06-16 RX ORDER — APREPITANT 80 MG/1
40 CAPSULE ORAL ONCE
Refills: 0 | Status: COMPLETED | OUTPATIENT
Start: 2021-06-16 | End: 2021-06-16

## 2021-06-16 RX ORDER — INSULIN LISPRO 100/ML
VIAL (ML) SUBCUTANEOUS
Refills: 0 | Status: DISCONTINUED | OUTPATIENT
Start: 2021-06-16 | End: 2021-06-18

## 2021-06-16 RX ORDER — GLUCAGON INJECTION, SOLUTION 0.5 MG/.1ML
1 INJECTION, SOLUTION SUBCUTANEOUS ONCE
Refills: 0 | Status: DISCONTINUED | OUTPATIENT
Start: 2021-06-16 | End: 2021-06-18

## 2021-06-16 RX ORDER — FAMOTIDINE 10 MG/ML
1 INJECTION INTRAVENOUS
Qty: 0 | Refills: 0 | DISCHARGE

## 2021-06-16 RX ORDER — DEXTROSE 50 % IN WATER 50 %
15 SYRINGE (ML) INTRAVENOUS ONCE
Refills: 0 | Status: DISCONTINUED | OUTPATIENT
Start: 2021-06-16 | End: 2021-06-18

## 2021-06-16 RX ADMIN — METHOCARBAMOL 500 MILLIGRAM(S): 500 TABLET, FILM COATED ORAL at 22:53

## 2021-06-16 RX ADMIN — Medication 4 MILLIGRAM(S): at 18:44

## 2021-06-16 RX ADMIN — Medication 50 MILLIGRAM(S): at 22:53

## 2021-06-16 RX ADMIN — Medication 1 APPLICATION(S): at 11:30

## 2021-06-16 RX ADMIN — HYDROMORPHONE HYDROCHLORIDE 0.5 MILLIGRAM(S): 2 INJECTION INTRAMUSCULAR; INTRAVENOUS; SUBCUTANEOUS at 17:52

## 2021-06-16 RX ADMIN — SENNA PLUS 2 TABLET(S): 8.6 TABLET ORAL at 22:53

## 2021-06-16 RX ADMIN — SODIUM CHLORIDE 75 MILLILITER(S): 9 INJECTION INTRAMUSCULAR; INTRAVENOUS; SUBCUTANEOUS at 18:02

## 2021-06-16 RX ADMIN — Medication 1000 MILLIGRAM(S): at 11:29

## 2021-06-16 RX ADMIN — ONDANSETRON 4 MILLIGRAM(S): 8 TABLET, FILM COATED ORAL at 21:32

## 2021-06-16 RX ADMIN — HYDROMORPHONE HYDROCHLORIDE 0.5 MILLIGRAM(S): 2 INJECTION INTRAMUSCULAR; INTRAVENOUS; SUBCUTANEOUS at 18:00

## 2021-06-16 RX ADMIN — HYDROMORPHONE HYDROCHLORIDE 0.5 MILLIGRAM(S): 2 INJECTION INTRAMUSCULAR; INTRAVENOUS; SUBCUTANEOUS at 20:30

## 2021-06-16 RX ADMIN — OXYCODONE HYDROCHLORIDE 10 MILLIGRAM(S): 5 TABLET ORAL at 21:42

## 2021-06-16 RX ADMIN — Medication 1000 MILLIGRAM(S): at 20:29

## 2021-06-16 RX ADMIN — GABAPENTIN 300 MILLIGRAM(S): 400 CAPSULE ORAL at 11:45

## 2021-06-16 RX ADMIN — Medication 2000 MILLIGRAM(S): at 22:52

## 2021-06-16 RX ADMIN — OXYCODONE HYDROCHLORIDE 10 MILLIGRAM(S): 5 TABLET ORAL at 21:06

## 2021-06-16 RX ADMIN — CELECOXIB 200 MILLIGRAM(S): 200 CAPSULE ORAL at 11:29

## 2021-06-16 RX ADMIN — HYDROMORPHONE HYDROCHLORIDE 0.5 MILLIGRAM(S): 2 INJECTION INTRAMUSCULAR; INTRAVENOUS; SUBCUTANEOUS at 20:58

## 2021-06-16 RX ADMIN — APREPITANT 40 MILLIGRAM(S): 80 CAPSULE ORAL at 11:29

## 2021-06-16 RX ADMIN — Medication 400 MILLIGRAM(S): at 19:10

## 2021-06-16 RX ADMIN — Medication 2: at 22:52

## 2021-06-16 RX ADMIN — CHLORHEXIDINE GLUCONATE 1 APPLICATION(S): 213 SOLUTION TOPICAL at 11:29

## 2021-06-16 NOTE — H&P ADULT - ASSESSMENT
61 male w/ GERD, h/o Prostate Ca with chronic neck pain and bilateral arm pain, left > right with radiculopathy w/ C3-C4, C4-C5 HNP.

## 2021-06-16 NOTE — H&P ADULT - HISTORY OF PRESENT ILLNESS
61 male w/ GERD, h/o Prostate cancer presenting for elective ACDF C3-C5 today with complaints of chronic neck pain for 15 years which has been gradually worsening, with associated radiation into bilateral arms, left worse than right. He also reports intermittent numbness of his hands and a sensation of weakness in his left hand. He denies any weakness or pain in his legs, denies gait changes, or bowel/bladder disfunction. He has done physical therapy previously without relief and has not done pain management.

## 2021-06-16 NOTE — H&P ADULT - NSICDXPASTMEDICALHX_GEN_ALL_CORE_FT
PAST MEDICAL HISTORY:  Cervical radiculopathy     Dyslipidemia     GERD (gastroesophageal reflux disease)     Prostate cancer 2017    Vertigo

## 2021-06-16 NOTE — H&P ADULT - NSHPPHYSICALEXAM_GEN_ALL_CORE
This is a  chronic condition.   Supplementation: recommend 4000 IU daily  Last Vitamin D level:   VIT D:   Lab Results   Component Value Date/Time    25HYDROXY 19 (L) 05/06/2021 1000     Patient denies any muscle aches or fatigue.      Gen: NAD, AAOx3  HEENT: PERRL. EOMI. VF intact. NC/AT.  Neck: FROM, nontender  Lungs: Clear b/l, no W/R/R.  Heart: S1, S2. NSR.  Abd: Soft, NT/ND. +BS  Exts: Pulses 2+ throughout, no C/C/E.  Neuro: CNs II-XII intact. 5/5 str x4 extremities. Sensation to LT intact. Following commands. Speech clear. Gait WNL. Normoreflexic throughout.

## 2021-06-16 NOTE — BRIEF OPERATIVE NOTE - NSICDXBRIEFPROCEDURE_GEN_ALL_CORE_FT
PROCEDURES:  Anterior cervical discectomy and fusion (ACDF) at 2 levels 16-Jun-2021 17:10:50 C3/4 and C4/5 Joe Cr

## 2021-06-16 NOTE — H&P ADULT - PROBLEM SELECTOR PLAN 1
Plan for ACDF C3-C4, C4-C5,  Consent in chart obtained by Dr. Richards,  Outpatient clearance reviewed,  Received COVID vaccine,  Plan for admission to telemetry, Neurosurgery,  D/w Dr. Richards

## 2021-06-16 NOTE — H&P ADULT - NSICDXPASTSURGICALHX_GEN_ALL_CORE_FT
PAST SURGICAL HISTORY:  H/O left inguinal hernia repair     H/O prostatectomy 2017    History of arthroscopy of left shoulder     History of tonsillectomy

## 2021-06-17 ENCOUNTER — TRANSCRIPTION ENCOUNTER (OUTPATIENT)
Age: 61
End: 2021-06-17

## 2021-06-17 LAB
ANION GAP SERPL CALC-SCNC: 8 MMOL/L — SIGNIFICANT CHANGE UP (ref 5–17)
BUN SERPL-MCNC: 14 MG/DL — SIGNIFICANT CHANGE UP (ref 7–23)
CALCIUM SERPL-MCNC: 8.7 MG/DL — SIGNIFICANT CHANGE UP (ref 8.4–10.5)
CHLORIDE SERPL-SCNC: 107 MMOL/L — SIGNIFICANT CHANGE UP (ref 96–108)
CO2 SERPL-SCNC: 24 MMOL/L — SIGNIFICANT CHANGE UP (ref 22–31)
COVID-19 SPIKE DOMAIN AB INTERP: POSITIVE
COVID-19 SPIKE DOMAIN ANTIBODY RESULT: >250 U/ML — HIGH
CREAT SERPL-MCNC: 0.92 MG/DL — SIGNIFICANT CHANGE UP (ref 0.5–1.3)
GLUCOSE SERPL-MCNC: 146 MG/DL — HIGH (ref 70–99)
HCT VFR BLD CALC: 39.4 % — SIGNIFICANT CHANGE UP (ref 39–50)
HGB BLD-MCNC: 13.2 G/DL — SIGNIFICANT CHANGE UP (ref 13–17)
MAGNESIUM SERPL-MCNC: 2.1 MG/DL — SIGNIFICANT CHANGE UP (ref 1.6–2.6)
MCHC RBC-ENTMCNC: 31.2 PG — SIGNIFICANT CHANGE UP (ref 27–34)
MCHC RBC-ENTMCNC: 33.5 GM/DL — SIGNIFICANT CHANGE UP (ref 32–36)
MCV RBC AUTO: 93.1 FL — SIGNIFICANT CHANGE UP (ref 80–100)
NRBC # BLD: 0 /100 WBCS — SIGNIFICANT CHANGE UP (ref 0–0)
PHOSPHATE SERPL-MCNC: 3 MG/DL — SIGNIFICANT CHANGE UP (ref 2.5–4.5)
PLATELET # BLD AUTO: 158 K/UL — SIGNIFICANT CHANGE UP (ref 150–400)
POTASSIUM SERPL-MCNC: 4.7 MMOL/L — SIGNIFICANT CHANGE UP (ref 3.5–5.3)
POTASSIUM SERPL-SCNC: 4.7 MMOL/L — SIGNIFICANT CHANGE UP (ref 3.5–5.3)
RBC # BLD: 4.23 M/UL — SIGNIFICANT CHANGE UP (ref 4.2–5.8)
RBC # FLD: 12 % — SIGNIFICANT CHANGE UP (ref 10.3–14.5)
SARS-COV-2 IGG+IGM SERPL QL IA: >250 U/ML — HIGH
SARS-COV-2 IGG+IGM SERPL QL IA: POSITIVE
SODIUM SERPL-SCNC: 139 MMOL/L — SIGNIFICANT CHANGE UP (ref 135–145)
WBC # BLD: 12.15 K/UL — HIGH (ref 3.8–10.5)
WBC # FLD AUTO: 12.15 K/UL — HIGH (ref 3.8–10.5)

## 2021-06-17 PROCEDURE — 99024 POSTOP FOLLOW-UP VISIT: CPT

## 2021-06-17 PROCEDURE — 72040 X-RAY EXAM NECK SPINE 2-3 VW: CPT | Mod: 26

## 2021-06-17 RX ORDER — METOCLOPRAMIDE HCL 10 MG
10 TABLET ORAL EVERY 4 HOURS
Refills: 0 | Status: DISCONTINUED | OUTPATIENT
Start: 2021-06-17 | End: 2021-06-18

## 2021-06-17 RX ORDER — ENOXAPARIN SODIUM 100 MG/ML
40 INJECTION SUBCUTANEOUS AT BEDTIME
Refills: 0 | Status: DISCONTINUED | OUTPATIENT
Start: 2021-06-17 | End: 2021-06-18

## 2021-06-17 RX ORDER — BENZOCAINE AND MENTHOL 5; 1 G/100ML; G/100ML
1 LIQUID ORAL EVERY 4 HOURS
Refills: 0 | Status: DISCONTINUED | OUTPATIENT
Start: 2021-06-17 | End: 2021-06-17

## 2021-06-17 RX ORDER — BENZOCAINE AND MENTHOL 5; 1 G/100ML; G/100ML
1 LIQUID ORAL EVERY 4 HOURS
Refills: 0 | Status: DISCONTINUED | OUTPATIENT
Start: 2021-06-17 | End: 2021-06-18

## 2021-06-17 RX ORDER — HYDROMORPHONE HYDROCHLORIDE 2 MG/ML
0.5 INJECTION INTRAMUSCULAR; INTRAVENOUS; SUBCUTANEOUS ONCE
Refills: 0 | Status: DISCONTINUED | OUTPATIENT
Start: 2021-06-17 | End: 2021-06-17

## 2021-06-17 RX ORDER — SCOPALAMINE 1 MG/3D
1 PATCH, EXTENDED RELEASE TRANSDERMAL ONCE
Refills: 0 | Status: COMPLETED | OUTPATIENT
Start: 2021-06-17 | End: 2021-06-17

## 2021-06-17 RX ADMIN — Medication 2000 MILLIGRAM(S): at 06:13

## 2021-06-17 RX ADMIN — Medication 2: at 12:52

## 2021-06-17 RX ADMIN — SCOPALAMINE 1 PATCH: 1 PATCH, EXTENDED RELEASE TRANSDERMAL at 18:41

## 2021-06-17 RX ADMIN — SENNA PLUS 2 TABLET(S): 8.6 TABLET ORAL at 21:44

## 2021-06-17 RX ADMIN — Medication 50 MILLIGRAM(S): at 21:43

## 2021-06-17 RX ADMIN — Medication 10 MILLIGRAM(S): at 18:37

## 2021-06-17 RX ADMIN — HYDROMORPHONE HYDROCHLORIDE 0.5 MILLIGRAM(S): 2 INJECTION INTRAMUSCULAR; INTRAVENOUS; SUBCUTANEOUS at 10:36

## 2021-06-17 RX ADMIN — HYDROMORPHONE HYDROCHLORIDE 0.5 MILLIGRAM(S): 2 INJECTION INTRAMUSCULAR; INTRAVENOUS; SUBCUTANEOUS at 10:08

## 2021-06-17 RX ADMIN — PANTOPRAZOLE SODIUM 40 MILLIGRAM(S): 20 TABLET, DELAYED RELEASE ORAL at 06:13

## 2021-06-17 RX ADMIN — METHOCARBAMOL 500 MILLIGRAM(S): 500 TABLET, FILM COATED ORAL at 15:24

## 2021-06-17 RX ADMIN — Medication 2 MILLIGRAM(S): at 18:37

## 2021-06-17 RX ADMIN — FAMOTIDINE 20 MILLIGRAM(S): 10 INJECTION INTRAVENOUS at 12:58

## 2021-06-17 RX ADMIN — ENOXAPARIN SODIUM 40 MILLIGRAM(S): 100 INJECTION SUBCUTANEOUS at 21:44

## 2021-06-17 RX ADMIN — Medication 4 MILLIGRAM(S): at 06:13

## 2021-06-17 RX ADMIN — Medication 50 MILLIGRAM(S): at 15:24

## 2021-06-17 RX ADMIN — METHOCARBAMOL 500 MILLIGRAM(S): 500 TABLET, FILM COATED ORAL at 21:44

## 2021-06-17 RX ADMIN — ONDANSETRON 4 MILLIGRAM(S): 8 TABLET, FILM COATED ORAL at 12:24

## 2021-06-17 RX ADMIN — Medication 10 MILLIGRAM(S): at 10:12

## 2021-06-17 RX ADMIN — ONDANSETRON 4 MILLIGRAM(S): 8 TABLET, FILM COATED ORAL at 06:18

## 2021-06-17 RX ADMIN — OXYCODONE HYDROCHLORIDE 5 MILLIGRAM(S): 5 TABLET ORAL at 13:30

## 2021-06-17 RX ADMIN — Medication 4 MILLIGRAM(S): at 01:29

## 2021-06-17 RX ADMIN — Medication 50 MILLIGRAM(S): at 06:13

## 2021-06-17 RX ADMIN — OXYCODONE HYDROCHLORIDE 5 MILLIGRAM(S): 5 TABLET ORAL at 12:00

## 2021-06-17 RX ADMIN — Medication 4 MILLIGRAM(S): at 12:57

## 2021-06-17 RX ADMIN — SCOPALAMINE 1 PATCH: 1 PATCH, EXTENDED RELEASE TRANSDERMAL at 15:24

## 2021-06-17 RX ADMIN — METHOCARBAMOL 500 MILLIGRAM(S): 500 TABLET, FILM COATED ORAL at 06:13

## 2021-06-17 NOTE — DISCHARGE NOTE PROVIDER - HOSPITAL COURSE
HPI:  61 male w/ GERD, h/o Prostate cancer presenting for elective ACDF C3-C5 today with complaints of chronic neck pain for 15 years which has been gradually worsening, with associated radiation into bilateral arms, left worse than right. He also reports intermittent numbness of his hands and a sensation of weakness in his left hand. He denies any weakness or pain in his legs, denies gait changes, or bowel/bladder disfunction. He has done physical therapy previously without relief and has not done pain management. (16 Jun 2021 13:35)    Hospital Course:  6/16: POD 0 ACDF C3-5, [place] GREEN to be delivered  6/17: POD 1. Pending standing XR and diet advancement.    Patient evaluated by PT/OT who recommended: ____  Patient is going home    Exam on day of discharge:  Constitutional: NAD, A&Ox3  Respiratory: breathing non-labored, CTAB  Cardiovascuar: RRR, + S1, S2  Gastrointestinal: abdomen soft, non tender  Neurological: Face symmetric, Verbal function intact, speech clear, tongue midline  Cranial Nerves: II-XII grossly intact  Motor: 5/5 power in b/l upper extremities, 5/5 strength equal bilateral lower extremities.  Sensation: intact to light touch and equal in all extremities  Pronator Drift: negative  Extremities: distal pulses 2+ x4  WOUND: dressing C/D/I anterior neck, neck soft, no hematoma, +HMV      Checklist:   - Obtained follow up appointment from NP  - Reviewed final recommendations of inpatient consults  - Neurologically stable for discharge  - Vitals stable for discharge   - Afebrile for discharge  - WBC is stable  - Sodium level is normal  - Pain is adequately controlled  - Pt has collar        HPI:  61 male w/ GERD, h/o Prostate cancer presenting for elective ACDF C3-C5 today with complaints of chronic neck pain for 15 years which has been gradually worsening, with associated radiation into bilateral arms, left worse than right. He also reports intermittent numbness of his hands and a sensation of weakness in his left hand. He denies any weakness or pain in his legs, denies gait changes, or bowel/bladder disfunction. He has done physical therapy previously without relief and has not done pain management. (16 Jun 2021 13:35)    Hospital Course:  6/16: POD 0 ACDF C3-5, [place] J to be delivered  6/17: POD 1. Pending standing XR and diet advancement.    Patient evaluated by PT/OT who recommended: Home with no needs and family support  Patient is going home    Exam on day of discharge:  Constitutional: NAD, A&Ox3  Respiratory: breathing non-labored, CTAB  Cardiovascuar: RRR, + S1, S2  Gastrointestinal: abdomen soft, non tender  Neurological: Face symmetric, Verbal function intact, speech clear, tongue midline  Cranial Nerves: II-XII grossly intact  Motor: 5/5 power in b/l upper extremities, 5/5 strength equal bilateral lower extremities.  Sensation: intact to light touch and equal in all extremities  Pronator Drift: negative  Extremities: distal pulses 2+ x4  WOUND: dressing C/D/I anterior neck, neck soft, no hematoma, +HMV      Patient is neuro stable, vitals stable, afebrile, pain well controlled, [place] J collar in place,     Checklist:   - Obtained follow up appointment from NP  - Reviewed final recommendations of inpatient consults       HPI:  61 male w/ GERD, h/o Prostate cancer presenting for elective ACDF C3-C5 today with complaints of chronic neck pain for 15 years which has been gradually worsening, with associated radiation into bilateral arms, left worse than right. He also reports intermittent numbness of his hands and a sensation of weakness in his left hand. He denies any weakness or pain in his legs, denies gait changes, or bowel/bladder disfunction. He has done physical therapy previously without relief and has not done pain management. (16 Jun 2021 13:35)    Hospital Course:  6/16: POD 0 ACDF C3-5, [place] J to be delivered  6/17: POD 1. Pending standing XR and diet advancement.    Patient evaluated by PT/OT who recommended: Home with no needs and family support  Patient is going home    Exam on day of discharge:  Constitutional: NAD, A&Ox3  Respiratory: breathing non-labored, CTAB  Cardiovascuar: RRR, + S1, S2  Gastrointestinal: abdomen soft, non tender  Neurological: Face symmetric, Verbal function intact, speech clear, tongue midline  Cranial Nerves: II-XII grossly intact  Motor: 5/5 power in b/l upper extremities, 5/5 strength equal bilateral lower extremities.  Sensation: intact to light touch and equal in all extremities  Pronator Drift: negative  Extremities: distal pulses 2+ x4  WOUND: dressing C/D/I anterior neck, neck soft, no hematoma, +HMV      Patient is neuro stable, vitals stable, afebrile, pain well controlled, [place] J collar in place, post op imaging completed, medically ready for discharge.

## 2021-06-17 NOTE — DISCHARGE NOTE PROVIDER - NSDCCPTREATMENT_GEN_ALL_CORE_FT
PRINCIPAL PROCEDURE  Procedure: Anterior cervical discectomy and fusion (ACDF) at 2 levels  Findings and Treatment: C3/4 and C4/5

## 2021-06-17 NOTE — DISCHARGE NOTE PROVIDER - NSDCFUADDINST_GEN_ALL_CORE_FT
Neurosurgery follow up appointment date/time:  - are staples/sutures in place?  - what day should staples/sutures be removed (POD 10-14)?  - please call the office to confirm appointment: [phone]     Wound Care:  - You may shower. Pat incision dry. No bathing or swimming.    Devices:  - Please wear your collar as instructed.    Activity:  - fatigue is common after surgery, rest if you feel tired   - no bending, lifting, twisting or heavy lifting   - walking is recommended, ambulate as tolerated  - you may shower when you get home, keep your incision dry  - no bathing   - no driving within 24 hours of anesthesia or while taking prescription pain medications   - keep hydrated, drink plenty of water     Please also follow up with your primary care doctor.     Pain Expectations:  - pain after surgery is expected  - please take pain meds as prescribed     Medications:  - pain medications can cause constipation, you should eat a high fiber diet and may take a stool softener while on pain meds   - Avoid taking Advil (ibuprofen), Motrin (naproxen), or Aspirin for pain as they can cause bleeding     Call the office or come to ED if:  - wound has drainage or bleeding, increased redness or pain at incision site, neurological change, fever (>101), chills, night sweats, syncope, nausea/vomiting      Playback:  - Please find a copy of your discharge instructions on Playback    WITHIN 24 HOURS OF DISCHARGE, PLEASE CONTACT NEURO PA  WITH ANY QUESTIONS OR CONCERNS: [phone]   OTHERWISE, PLEASE CALL THE OFFICE WITH ANY QUESTIONS OR CONCERNS: [phone] Neurosurgery follow up:  - you have absorbable sutures in place  - you will need a wound check in 2 weeks  - please call the office to make/confirm appointment: [phone]     Wound Care:  - You may shower. Pat incision dry. No bathing or swimming.    Devices:  - Please wear your collar as instructed.    Activity:  - fatigue is common after surgery, rest if you feel tired   - no bending, lifting, twisting or heavy lifting   - walking is recommended, ambulate as tolerated  - you may shower when you get home, keep your incision dry  - no bathing   - no driving within 24 hours of anesthesia or while taking prescription pain medications   - keep hydrated, drink plenty of water     Please also follow up with your primary care doctor.     Pain Expectations:  - pain after surgery is expected  - please take pain meds as prescribed     Medications:  - continue home medications  - please take Decadron 1mg every 8 hours x1 day  - continue taking Omeprazole, especially while on Decadron  - pain meds: Lyrica 75mg every 8 hours. Robaxin as needed, Percocet as needed  - pain medications can cause constipation, you should eat a high fiber diet and may take a stool softener while on pain meds   - Avoid taking Advil (ibuprofen), Motrin (naproxen), or Aspirin for pain as they can cause bleeding     Call the office or come to ED if:  - wound has drainage or bleeding, increased redness or pain at incision site, neurological change, fever (>101), chills, night sweats, syncope, nausea/vomiting      Playback:  - Please find a copy of your discharge instructions on Playback    WITHIN 24 HOURS OF DISCHARGE, PLEASE CONTACT NEURO PA  WITH ANY QUESTIONS OR CONCERNS: [phone]   OTHERWISE, PLEASE CALL THE OFFICE WITH ANY QUESTIONS OR CONCERNS: [phone]

## 2021-06-17 NOTE — OCCUPATIONAL THERAPY INITIAL EVALUATION ADULT - PLANNED THERAPY INTERVENTIONS, OT EVAL
ADL retraining/IADL retraining/balance training/bed mobility training/fine motor coordination training/motor coordination training/neuromuscular re-education/ROM/strengthening/transfer training

## 2021-06-17 NOTE — PHYSICAL THERAPY INITIAL EVALUATION ADULT - PERTINENT HX OF CURRENT PROBLEM, REHAB EVAL
61 male w/ GERD, h/o Prostate cancer presenting for elective ACDF C3-C5 today with complaints of chronic neck pain for 15 years which has been gradually worsening, with associated radiation into bilateral arms, left worse than right. He also reports intermittent numbness of his hands and a sensation of weakness in his left hand. He denies any weakness or pain in his legs, denies gait changes, or bowel/bladder disfunction.

## 2021-06-17 NOTE — OCCUPATIONAL THERAPY INITIAL EVALUATION ADULT - GENERAL OBSERVATIONS, REHAB EVAL
Pt's RN Brook aware of intent to eval/tx; cleared Pt. Pt received in supine - +Mignon Barry, rajesh hamilton, scds. Pt agreeable to OT. Pt's RN Brook aware of intent to eval/tx; cleared Pt. Pt received in supine - +[place] J collar, IVs, hemovac, rajesh hamilton, scds. Pt agreeable to OT.

## 2021-06-17 NOTE — PROCEDURE NOTE - GENERAL PROCEDURE DETAILS
1 Hemo-vac in the right side of the neck was removed. Suction was stopped, site prepped with chlorhexidene, drain stitch was cut and the hemo-vac was removed without resistance. The drain tip was visualized and intact. A Steri-strip was placed over the site and covered with clean gauze and Tegaderm.

## 2021-06-17 NOTE — PROCEDURE NOTE - ADDITIONAL PROCEDURE DETAILS
1 Hemo-vac in the _______ was removed. Suction was stopped, site prepped with chlorhexidene, drain stitch was cut and the hemo-vac was removed without resistance. The drain tip was visualized and intact. Steri-strips were placed over the site and covered with clean gauze and Tegaderm.

## 2021-06-17 NOTE — OCCUPATIONAL THERAPY INITIAL EVALUATION ADULT - ADDITIONAL COMMENTS
Pt lives with children in apt w/ elevator access. Pt states that he was independent in all activities prior to admission. Pt denies prior use/possession of AEs/DMEs.

## 2021-06-17 NOTE — DISCHARGE NOTE PROVIDER - NSDCMRMEDTOKEN_GEN_ALL_CORE_FT
omeprazole 20 mg oral delayed release capsule: 1 cap(s) orally once a day, As Needed  Pepcid 20 mg oral tablet: 1 tab(s) orally once a day   acetaminophen 325 mg oral tablet: 2 tab(s) orally every 6 hours, As needed, Temp greater or equal to 38C (100.4F), Mild Pain (1 - 3)  dexamethasone 1 mg oral tablet: 1 tab(s) orally every 8 hours x1 day  methocarbamol 500 mg oral tablet: 1 tab(s) orally every 8 hours, As Needed -for muscle spasm MDD:3 tabs  omeprazole 20 mg oral delayed release capsule: 1 cap(s) orally once a day, As Needed  oxycodone-acetaminophen 5 mg-325 mg oral tablet: 1 tab(s) orally every 6 hours, As Needed -for moderate pain - for severe pain MDD:4 tabs   Pepcid 20 mg oral tablet: 1 tab(s) orally once a day  pregabalin 50 mg oral capsule: 1 cap(s) orally 3 times a day MDD:3 tabs  senna oral tablet: 2 tab(s) orally once a day (at bedtime)

## 2021-06-17 NOTE — OCCUPATIONAL THERAPY INITIAL EVALUATION ADULT - MD ORDER
61 male w/ GERD, h/o Prostate cancer presenting for elective ACDF C3-C5 today with complaints of chronic neck pain for 15 years which has been gradually worsening, with associated radiation into bilateral arms, left worse than right. He also reports intermittent numbness of his hands and a sensation of weakness in his left hand.

## 2021-06-17 NOTE — DISCHARGE NOTE PROVIDER - CARE PROVIDER_API CALL
Mele Richards)  Neurosurgery  [address], [place] [facility]  Phone: [phone]  Fax: [phone]  Follow Up Time:

## 2021-06-17 NOTE — OCCUPATIONAL THERAPY INITIAL EVALUATION ADULT - PERTINENT HX OF CURRENT PROBLEM, REHAB EVAL
Chronic neck pain and bilateral arm pain, left ><right with radiculopathy w/ C3-C4, C4-C5 HNP.  Now s/p ACDF C3-5 6/16/21.

## 2021-06-17 NOTE — DISCHARGE NOTE PROVIDER - NSDCCPCAREPLAN_GEN_ALL_CORE_FT
PRINCIPAL DISCHARGE DIAGNOSIS  Diagnosis: Cervical radiculopathy  Assessment and Plan of Treatment: Cervical radiculopathy       PRINCIPAL DISCHARGE DIAGNOSIS  Diagnosis: Cervical radiculopathy  Assessment and Plan of Treatment: Cervical radiculopathy, s/p C3-5 ACDF

## 2021-06-17 NOTE — PHYSICAL THERAPY INITIAL EVALUATION ADULT - ADDITIONAL COMMENTS
Pt lives in an elevator access apartment with steps to enter and has his daughter and son living with him. Pt denies use of DME prior to sx.

## 2021-06-18 ENCOUNTER — TRANSCRIPTION ENCOUNTER (OUTPATIENT)
Age: 61
End: 2021-06-18

## 2021-06-18 VITALS
SYSTOLIC BLOOD PRESSURE: 122 MMHG | RESPIRATION RATE: 16 BRPM | TEMPERATURE: 98 F | HEART RATE: 63 BPM | DIASTOLIC BLOOD PRESSURE: 58 MMHG | OXYGEN SATURATION: 94 %

## 2021-06-18 PROBLEM — M54.12 RADICULOPATHY, CERVICAL REGION: Chronic | Status: ACTIVE | Noted: 2021-06-15

## 2021-06-18 PROBLEM — C61 MALIGNANT NEOPLASM OF PROSTATE: Chronic | Status: ACTIVE | Noted: 2021-06-15

## 2021-06-18 PROBLEM — E78.5 HYPERLIPIDEMIA, UNSPECIFIED: Chronic | Status: ACTIVE | Noted: 2021-06-15

## 2021-06-18 PROBLEM — K21.9 GASTRO-ESOPHAGEAL REFLUX DISEASE WITHOUT ESOPHAGITIS: Chronic | Status: ACTIVE | Noted: 2021-06-15

## 2021-06-18 PROBLEM — R42 DIZZINESS AND GIDDINESS: Chronic | Status: ACTIVE | Noted: 2021-06-15

## 2021-06-18 LAB
ANION GAP SERPL CALC-SCNC: 9 MMOL/L — SIGNIFICANT CHANGE UP (ref 5–17)
BUN SERPL-MCNC: 15 MG/DL — SIGNIFICANT CHANGE UP (ref 7–23)
CALCIUM SERPL-MCNC: 8.9 MG/DL — SIGNIFICANT CHANGE UP (ref 8.4–10.5)
CHLORIDE SERPL-SCNC: 111 MMOL/L — HIGH (ref 96–108)
CO2 SERPL-SCNC: 25 MMOL/L — SIGNIFICANT CHANGE UP (ref 22–31)
CREAT SERPL-MCNC: 1.06 MG/DL — SIGNIFICANT CHANGE UP (ref 0.5–1.3)
GLUCOSE SERPL-MCNC: 122 MG/DL — HIGH (ref 70–99)
HCT VFR BLD CALC: 38.2 % — LOW (ref 39–50)
HGB BLD-MCNC: 12.8 G/DL — LOW (ref 13–17)
MAGNESIUM SERPL-MCNC: 2.3 MG/DL — SIGNIFICANT CHANGE UP (ref 1.6–2.6)
MCHC RBC-ENTMCNC: 32.2 PG — SIGNIFICANT CHANGE UP (ref 27–34)
MCHC RBC-ENTMCNC: 33.5 GM/DL — SIGNIFICANT CHANGE UP (ref 32–36)
MCV RBC AUTO: 96 FL — SIGNIFICANT CHANGE UP (ref 80–100)
NRBC # BLD: 0 /100 WBCS — SIGNIFICANT CHANGE UP (ref 0–0)
PHOSPHATE SERPL-MCNC: 3.4 MG/DL — SIGNIFICANT CHANGE UP (ref 2.5–4.5)
PLATELET # BLD AUTO: 165 K/UL — SIGNIFICANT CHANGE UP (ref 150–400)
POTASSIUM SERPL-MCNC: 4.6 MMOL/L — SIGNIFICANT CHANGE UP (ref 3.5–5.3)
POTASSIUM SERPL-SCNC: 4.6 MMOL/L — SIGNIFICANT CHANGE UP (ref 3.5–5.3)
RBC # BLD: 3.98 M/UL — LOW (ref 4.2–5.8)
RBC # FLD: 12.2 % — SIGNIFICANT CHANGE UP (ref 10.3–14.5)
SODIUM SERPL-SCNC: 145 MMOL/L — SIGNIFICANT CHANGE UP (ref 135–145)
WBC # BLD: 13.63 K/UL — HIGH (ref 3.8–10.5)
WBC # FLD AUTO: 13.63 K/UL — HIGH (ref 3.8–10.5)

## 2021-06-18 PROCEDURE — 85027 COMPLETE CBC AUTOMATED: CPT

## 2021-06-18 PROCEDURE — 80048 BASIC METABOLIC PNL TOTAL CA: CPT

## 2021-06-18 PROCEDURE — C1889: CPT

## 2021-06-18 PROCEDURE — 84100 ASSAY OF PHOSPHORUS: CPT

## 2021-06-18 PROCEDURE — 72040 X-RAY EXAM NECK SPINE 2-3 VW: CPT

## 2021-06-18 PROCEDURE — 86901 BLOOD TYPING SEROLOGIC RH(D): CPT

## 2021-06-18 PROCEDURE — 76000 FLUOROSCOPY <1 HR PHYS/QHP: CPT

## 2021-06-18 PROCEDURE — 86850 RBC ANTIBODY SCREEN: CPT

## 2021-06-18 PROCEDURE — 97161 PT EVAL LOW COMPLEX 20 MIN: CPT

## 2021-06-18 PROCEDURE — 99024 POSTOP FOLLOW-UP VISIT: CPT

## 2021-06-18 PROCEDURE — C1713: CPT

## 2021-06-18 PROCEDURE — 36415 COLL VENOUS BLD VENIPUNCTURE: CPT

## 2021-06-18 PROCEDURE — 83735 ASSAY OF MAGNESIUM: CPT

## 2021-06-18 PROCEDURE — 82962 GLUCOSE BLOOD TEST: CPT

## 2021-06-18 PROCEDURE — 86769 SARS-COV-2 COVID-19 ANTIBODY: CPT

## 2021-06-18 PROCEDURE — 85025 COMPLETE CBC W/AUTO DIFF WBC: CPT

## 2021-06-18 PROCEDURE — 86900 BLOOD TYPING SEROLOGIC ABO: CPT

## 2021-06-18 RX ORDER — ACETAMINOPHEN 500 MG
2 TABLET ORAL
Qty: 0 | Refills: 0 | DISCHARGE
Start: 2021-06-18

## 2021-06-18 RX ORDER — METHOCARBAMOL 500 MG/1
1 TABLET, FILM COATED ORAL
Qty: 15 | Refills: 0
Start: 2021-06-18 | End: 2021-06-22

## 2021-06-18 RX ORDER — DEXAMETHASONE 0.5 MG/5ML
1 ELIXIR ORAL
Qty: 3 | Refills: 0
Start: 2021-06-18 | End: 2021-06-18

## 2021-06-18 RX ORDER — SENNA PLUS 8.6 MG/1
2 TABLET ORAL
Qty: 14 | Refills: 0
Start: 2021-06-18 | End: 2021-06-24

## 2021-06-18 RX ADMIN — METHOCARBAMOL 500 MILLIGRAM(S): 500 TABLET, FILM COATED ORAL at 06:43

## 2021-06-18 RX ADMIN — Medication 2 MILLIGRAM(S): at 11:52

## 2021-06-18 RX ADMIN — SCOPALAMINE 1 PATCH: 1 PATCH, EXTENDED RELEASE TRANSDERMAL at 07:16

## 2021-06-18 RX ADMIN — Medication 50 MILLIGRAM(S): at 06:43

## 2021-06-18 RX ADMIN — Medication 2 MILLIGRAM(S): at 00:57

## 2021-06-18 RX ADMIN — Medication 2 MILLIGRAM(S): at 06:43

## 2021-06-18 RX ADMIN — FAMOTIDINE 20 MILLIGRAM(S): 10 INJECTION INTRAVENOUS at 11:54

## 2021-06-18 RX ADMIN — PANTOPRAZOLE SODIUM 40 MILLIGRAM(S): 20 TABLET, DELAYED RELEASE ORAL at 06:43

## 2021-06-18 NOTE — DISCHARGE NOTE NURSING/CASE MANAGEMENT/SOCIAL WORK - PATIENT PORTAL LINK FT
You can access the FollowMyHealth Patient Portal offered by [facility] by registering at the following website: [email or web address]. By joining [facility]’s FollowMyHealth portal, you will also be able to view your health information using other applications (apps) compatible with our system.

## 2021-06-18 NOTE — PROGRESS NOTE ADULT - SUBJECTIVE AND OBJECTIVE BOX
NEUROSURGERY POST OP NOTE:    POD# 0 S/P C3-4 and C4-5 ACDF    S: Patient seen in PACU at bedside with mild-mod neck pain. Dilaudid given, patient resting comfortably. Reports resolution of N&T to upper extremities.      T(C): 36.8 (06-16-21 @ 17:25), Max: 36.8 (06-16-21 @ 17:25)  HR: 70 (06-16-21 @ 18:10) (69 - 74)  BP: 104/56 (06-16-21 @ 18:10) (104/56 - 121/60)  RR: 18 (06-16-21 @ 18:10) (18 - 24)  SpO2: 96% (06-16-21 @ 18:10) (96% - 98%)      06-16-21 @ 07:01  -  06-16-21 @ 18:15  --------------------------------------------------------  IN: 75 mL / OUT: 0 mL / NET: 75 mL        acetaminophen   Tablet .. 650 milliGRAM(s) Oral every 6 hours PRN  ceFAZolin  Injectable. 2000 milliGRAM(s) IV Push every 8 hours  dexAMETHasone  Injectable 4 milliGRAM(s) IV Push every 6 hours  dexAMETHasone  Injectable   IV Push   dextrose 40% Gel 15 Gram(s) Oral once  dextrose 5%. 1000 milliLiter(s) IV Continuous <Continuous>  dextrose 5%. 1000 milliLiter(s) IV Continuous <Continuous>  dextrose 50% Injectable 25 Gram(s) IV Push once  dextrose 50% Injectable 12.5 Gram(s) IV Push once  dextrose 50% Injectable 25 Gram(s) IV Push once  famotidine    Tablet 20 milliGRAM(s) Oral daily  glucagon  Injectable 1 milliGRAM(s) IntraMuscular once  HYDROmorphone  Injectable 0.5 milliGRAM(s) IV Push every 6 hours PRN  insulin lispro (ADMELOG) corrective regimen sliding scale   SubCutaneous Before meals and at bedtime  methocarbamol 500 milliGRAM(s) Oral every 8 hours  ondansetron   Disintegrating Tablet 4 milliGRAM(s) Oral every 6 hours PRN  oxyCODONE    IR 5 milliGRAM(s) Oral every 4 hours PRN  pantoprazole    Tablet 40 milliGRAM(s) Oral before breakfast  pregabalin 50 milliGRAM(s) Oral three times a day  senna 2 Tablet(s) Oral at bedtime  sodium chloride 0.9%. 1000 milliLiter(s) IV Continuous <Continuous>      Exam:  Constitutional: NAD, A&Ox3  Respiratory: breathing non-labored, CTAB  Cardiovascuar: RRR, + S1, S2  Gastrointestinal: abdomen soft, non tender  Neurological: Face symmetric, Verbal function intact, speech clear, tongue midline  Cranial Nerves: II-XII grossly intact  Motor: 4+/5 power in b/l upper extremities effort limited, 5/5 strength equal bilateral lower extremities.  Sensation: intact to light touch and equal in all extremities  Pronator Drift: negative  Extremities: distal pulses 2+ x4  WOUND: dressing C/D/I anterior neck    Assessment:   Assessment: 61 male w/ GERD, h/o Prostate Ca with chronic neck pain and bilateral arm pain, left > right with radiculopathy w/ C3-C4, C4-C5 HNP now s/p ACDF C3-5 6/16/21.      PLAN:  Neuro:  -Neuro/vital checks q 4  -Decadron taper x 3 days  -Pain control PRN (ERAS ordered)  -Pending standing x rays 24 H post op  -1 HMV- monitor drain output    Cardio:  -Normotensive SBP goal    Pulm:  -Satting well on RA  -IS    GI:  -ADAT  -Bowel regimen  -Zofran PRN for nausea post op  -PPI (while on steroids/home med)    RENAL:  - repletions PRN  - Voiding well  - IVF until adequate PO intake.    ID:  -Periop abx   -Afebrile    ENDO:  -ISS    HEME:  -SCDs    DISPO: stepdown, possibly home tomorrow, pending PT/OT    Case discussed with Dr. Richards         Assessment:  Present when checked    []  GCS  E   V  M     Heart Failure: []Acute, [] acute on chronic , []chronic  Heart Failure:  [] Diastolic (HFpEF), [] Systolic (HFrEF), []Combined (HFpEF and HFrEF), [] RHF, [] Pulm HTN, [] Other    [] CINDY, [] ATN, [] AIN, [] other  [] CKD1, [] CKD2, [] CKD 3, [] CKD 4, [] CKD 5, []ESRD    Encephalopathy: [] Metabolic, [] Hepatic, [] toxic, [] Neurological, [] Other    Abnormal Nurtitional Status: [] malnurtition (see nutrition note), [ ]underweight: BMI < 19, [] morbid obesity: BMI >40, [] Cachexia    [] Sepsis  [] hypovolemic shock,[] cardiogenic shock, [] hemorrhagic shock, [] neuogenic shock  [] Acute Respiratory Failure  []Cerebral edema, [] Brain compression/ herniation,   [] Functional quadriplegia  [] Acute blood loss anemia      
HPI:  61 male w/ GERD, h/o Prostate cancer presenting for elective ACDF C3-C5 today with complaints of chronic neck pain for 15 years which has been gradually worsening, with associated radiation into bilateral arms, left worse than right. He also reports intermittent numbness of his hands and a sensation of weakness in his left hand. He denies any weakness or pain in his legs, denies gait changes, or bowel/bladder disfunction. He has done physical therapy previously without relief and has not done pain management. (16 Jun 2021 13:35)    OVERNIGHT EVENTS: Required dilaudid pushes for pain control.     Hospital Course:  6/16: POD 0 ACDF C3-5, pending standing x rays and [place] J brace  6/17: POD 1. Pending standing XR and diet advancement.      Vital Signs Last 24 Hrs  T(C): 36.1 (16 Jun 2021 21:00), Max: 36.8 (16 Jun 2021 17:25)  T(F): 97 (16 Jun 2021 21:00), Max: 98.2 (16 Jun 2021 17:25)  HR: 62 (16 Jun 2021 21:30) (62 - 74)  BP: 117/56 (16 Jun 2021 21:30) (100/58 - 123/59)  BP(mean): 81 (16 Jun 2021 21:30) (74 - 85)  RR: 19 (16 Jun 2021 21:30) (14 - 24)  SpO2: 96% (16 Jun 2021 21:30) (95% - 98%)    I&O's Summary    16 Jun 2021 07:01  -  17 Jun 2021 02:42  --------------------------------------------------------  IN: 675 mL / OUT: 150 mL / NET: 525 mL        PHYSICAL EXAM:  Constitutional: NAD, A&Ox3  Respiratory: breathing non-labored, CTAB  Cardiovascuar: RRR, + S1, S2  Gastrointestinal: abdomen soft, non tender  Neurological: Face symmetric, Verbal function intact, speech clear, tongue midline  Cranial Nerves: II-XII grossly intact  Motor: 4+/5 power in b/l upper extremities effort limited, 5/5 strength equal bilateral lower extremities.  Sensation: intact to light touch and equal in all extremities  Pronator Drift: negative  Extremities: distal pulses 2+ x4  WOUND: dressing C/D/I anterior neck, neck soft, no hematoma, +HMV    TUBES/LINES:  [] Coffey  [] Lumbar Drain  [x] Wound Drains - HMVx1  [] Others      DIET:  [] NPO  [x] Mechanical  [] Tube feeds    LABS:                        13.1   9.43  )-----------( 138      ( 16 Jun 2021 17:46 )             39.2     06-16    143  |  108  |  14  ----------------------------<  168<H>  4.4   |  24  |  0.94    Ca    8.6      16 Jun 2021 17:46              CAPILLARY BLOOD GLUCOSE  POCT Blood Glucose.: 174 mg/dL (16 Jun 2021 22:34)  POCT Blood Glucose.: 159 mg/dL (16 Jun 2021 17:42)      Drug Levels: [] N/A    CSF Analysis: [] N/A      Allergies    No Known Allergies    Intolerances      MEDICATIONS:  Antibiotics:  ceFAZolin  Injectable. 2000 milliGRAM(s) IV Push every 8 hours    Neuro:  acetaminophen   Tablet .. 650 milliGRAM(s) Oral every 6 hours PRN  HYDROmorphone  Injectable 0.5 milliGRAM(s) IV Push every 6 hours PRN  methocarbamol 500 milliGRAM(s) Oral every 8 hours  ondansetron Injectable 4 milliGRAM(s) IV Push every 6 hours PRN  oxyCODONE    IR 5 milliGRAM(s) Oral every 4 hours PRN  oxyCODONE    IR 10 milliGRAM(s) Oral every 6 hours PRN  pregabalin 50 milliGRAM(s) Oral three times a day    Anticoagulation:    OTHER:  dexAMETHasone  Injectable 4 milliGRAM(s) IV Push every 6 hours  dexAMETHasone  Injectable 2 milliGRAM(s) IV Push every 6 hours  dexAMETHasone  Injectable   IV Push   dextrose 40% Gel 15 Gram(s) Oral once  dextrose 50% Injectable 25 Gram(s) IV Push once  dextrose 50% Injectable 12.5 Gram(s) IV Push once  dextrose 50% Injectable 25 Gram(s) IV Push once  famotidine    Tablet 20 milliGRAM(s) Oral daily  glucagon  Injectable 1 milliGRAM(s) IntraMuscular once  insulin lispro (ADMELOG) corrective regimen sliding scale   SubCutaneous Before meals and at bedtime  pantoprazole    Tablet 40 milliGRAM(s) Oral before breakfast  senna 2 Tablet(s) Oral at bedtime    IVF:  dextrose 5%. 1000 milliLiter(s) IV Continuous <Continuous>  dextrose 5%. 1000 milliLiter(s) IV Continuous <Continuous>  sodium chloride 0.9%. 1000 milliLiter(s) IV Continuous <Continuous>    CULTURES:    RADIOLOGY & ADDITIONAL TESTS:      ASSESSMENT:  Assessment: 61 male w/ GERD, h/o Prostate Ca with chronic neck pain and bilateral arm pain, left > right with radiculopathy w/ C3-C4, C4-C5 HNP now s/p ACDF C3-5 6/16/21.        PLAN:  Neuro:  -Neuro/vital checks q 4  -Decadron taper x 3 days  -Pain control PRN (ERAS ordered)  -Pending standing x rays 24 H post op  -1 HMV- monitor drain output    Cardio:  -Normotensive SBP goal    Pulm:  -Satting well on RA  -IS    GI:  -ADAT  -Bowel regimen  -Zofran PRN for nausea post op  -PPI (while on steroids/home med)    RENAL:  - repletions PRN  -Voiding well    ID:  -Periop abx   -Afebrile    ENDO:  -ISS    HEME:  -SCDs    DISPO: stepdown, possibly home today, pending PT/OT    Case discussed with Dr. Richards   
HPI:  61 male w/ GERD, h/o Prostate cancer presenting for elective ACDF C3-C5 today with complaints of chronic neck pain for 15 years which has been gradually worsening, with associated radiation into bilateral arms, left worse than right. He also reports intermittent numbness of his hands and a sensation of weakness in his left hand. He denies any weakness or pain in his legs, denies gait changes, or bowel/bladder disfunction. He has done physical therapy previously without relief and has not done pain management. (16 Jun 2021 13:35)    Hospital Course:  6/16: POD 0 ACDF C3-5, pending standing x rays and Craig J brace  6/17: POD 1. Pending standing XR and diet advancement.  6/18: POD 2 ALISSA overnight. Neuro exam stable. Nausea improved.     Vital Signs Last 24 Hrs  T(C): 37.1 (18 Jun 2021 00:40), Max: 37.1 (17 Jun 2021 21:37)  T(F): 98.7 (18 Jun 2021 00:40), Max: 98.7 (17 Jun 2021 21:37)  HR: 66 (18 Jun 2021 00:40) (65 - 97)  BP: 106/59 (18 Jun 2021 00:40) (106/59 - 153/67)  BP(mean): 87 (17 Jun 2021 19:51) (87 - 97)  RR: 16 (18 Jun 2021 00:40) (16 - 18)  SpO2: 95% (18 Jun 2021 00:40) (94% - 98%)    I&O's Summary    16 Jun 2021 07:01  -  17 Jun 2021 07:00  --------------------------------------------------------  IN: 975 mL / OUT: 150 mL / NET: 825 mL    17 Jun 2021 07:01  -  18 Jun 2021 01:43  --------------------------------------------------------  IN: 901 mL / OUT: 2100 mL / NET: -1199 mL      PHYSICAL EXAM:  Constitutional: NAD, A&Ox3  Respiratory: breathing non-labored, CTAB  Cardiovascuar: RRR, + S1, S2  Gastrointestinal: abdomen soft, non tender  Neurological: Face symmetric, verbal function intact, speech clear, tongue midline  Cranial Nerves: II-XII grossly intact  Motor: 5/5 UE and LE B/L throughout  Sensation: intact to light touch and equal in all extremities  Pronator Drift: negative  Extremities: distal pulses 2+ x4  WOUND: anterior neck incision site C/D/I, neck soft, no hematoma    TUBES/LINES:  [] Coffey  [] Lumbar Drain  [] Wound Drains   [] Others    DIET:  [] NPO  [x] Mechanical  [] Tube feeds    LABS:                        13.2   12.15 )-----------( 158      ( 17 Jun 2021 05:35 )             39.4     06-17    139  |  107  |  14  ----------------------------<  146<H>  4.7   |  24  |  0.92    Ca    8.7      17 Jun 2021 05:35  Phos  3.0     06-17  Mg     2.1     06-17      CAPILLARY BLOOD GLUCOSE      POCT Blood Glucose.: 134 mg/dL (17 Jun 2021 22:19)  POCT Blood Glucose.: 117 mg/dL (17 Jun 2021 18:40)  POCT Blood Glucose.: 159 mg/dL (17 Jun 2021 12:06)  POCT Blood Glucose.: 131 mg/dL (17 Jun 2021 08:12)      Drug Levels: [] N/A    CSF Analysis: [] N/A      Allergies    No Known Allergies    Intolerances      MEDICATIONS:  Antibiotics:    Neuro:  acetaminophen   Tablet .. 650 milliGRAM(s) Oral every 6 hours PRN  methocarbamol 500 milliGRAM(s) Oral every 8 hours  metoclopramide Injectable 10 milliGRAM(s) IV Push every 4 hours PRN  ondansetron Injectable 4 milliGRAM(s) IV Push every 6 hours PRN  oxyCODONE    IR 5 milliGRAM(s) Oral every 4 hours PRN  oxyCODONE    IR 10 milliGRAM(s) Oral every 6 hours PRN  pregabalin 50 milliGRAM(s) Oral three times a day    Anticoagulation:  enoxaparin Injectable 40 milliGRAM(s) SubCutaneous at bedtime    OTHER:  benzocaine 15 mG/menthol 3.6 mG Lozenge 1 Lozenge Oral every 4 hours PRN  dexAMETHasone  Injectable   IV Push   dexAMETHasone  Injectable 2 milliGRAM(s) IV Push every 6 hours  dexAMETHasone  Injectable 1 milliGRAM(s) IV Push every 6 hours  dextrose 40% Gel 15 Gram(s) Oral once  dextrose 50% Injectable 25 Gram(s) IV Push once  dextrose 50% Injectable 12.5 Gram(s) IV Push once  dextrose 50% Injectable 25 Gram(s) IV Push once  famotidine    Tablet 20 milliGRAM(s) Oral daily  glucagon  Injectable 1 milliGRAM(s) IntraMuscular once  insulin lispro (ADMELOG) corrective regimen sliding scale   SubCutaneous Before meals and at bedtime  pantoprazole    Tablet 40 milliGRAM(s) Oral before breakfast  senna 2 Tablet(s) Oral at bedtime    IVF:  dextrose 5%. 1000 milliLiter(s) IV Continuous <Continuous>  dextrose 5%. 1000 milliLiter(s) IV Continuous <Continuous>    CULTURES:    RADIOLOGY & ADDITIONAL TESTS:      ASSESSMENT:  61 male w/ GERD, h/o Prostate Ca with chronic neck pain and bilateral arm pain, left > right with radiculopathy w/ C3-C4, C4-C5 HNP now s/p ACDF C3-5 6/16/21    M54.12    No pertinent family history in first degree relatives    Handoff    MEWS Score    Cervical radiculopathy    Vertigo    Prostate cancer    Dyslipidemia    GERD (gastroesophageal reflux disease)    Stenosis, cervical spine    Stenosis, cervical spine    Anterior cervical discectomy and fusion (ACDF) at 2 levels    Cervical radiculopathy    Cervical radiculopathy    GERD (gastroesophageal reflux disease)    Prostate cancer    Dyslipidemia    Vertigo    Anterior cervical discectomy and fusion (ACDF) at 2 levels    H/O prostatectomy    History of arthroscopy of left shoulder    History of tonsillectomy    H/O left inguinal hernia repair    SysAdmin_[email or web address]        PLAN:  Neuro:  -Neuro/vital checks q 4  -Decadron taper x 3 days  -Pain control PRN (ERAS ordered)  -postop C-spine XR complete    Cardio:  -Normotensive SBP goal    Pulm:  -Satting well on RA  -IS    GI:  -Bowel regimen  -Zofran, Reglan, scopalimine for nausea post op  -PPI (while on steroids/home med)    RENAL:  -repletions PRN  -Voiding well    ID:  -Periop abx completed  -Afebrile    ENDO:  -ISS    HEME:  -SCDs  -SQL    DISPO: stepdown, possibly home today, pending PT/OT    Case discussed with Dr. Richards

## 2021-06-21 ENCOUNTER — APPOINTMENT (OUTPATIENT)
Dept: NEUROSURGERY | Facility: CLINIC | Age: 61
End: 2021-06-21
Payer: MEDICARE

## 2021-06-21 ENCOUNTER — OUTPATIENT (OUTPATIENT)
Dept: OUTPATIENT SERVICES | Facility: HOSPITAL | Age: 61
LOS: 1 days | End: 2021-06-21
Payer: MEDICARE

## 2021-06-21 ENCOUNTER — RESULT REVIEW (OUTPATIENT)
Age: 61
End: 2021-06-21

## 2021-06-21 VITALS
HEART RATE: 90 BPM | DIASTOLIC BLOOD PRESSURE: 68 MMHG | SYSTOLIC BLOOD PRESSURE: 101 MMHG | RESPIRATION RATE: 14 BRPM | TEMPERATURE: 98.4 F | OXYGEN SATURATION: 98 %

## 2021-06-21 DIAGNOSIS — Z90.79 ACQUIRED ABSENCE OF OTHER GENITAL ORGAN(S): Chronic | ICD-10-CM

## 2021-06-21 DIAGNOSIS — Z90.89 ACQUIRED ABSENCE OF OTHER ORGANS: Chronic | ICD-10-CM

## 2021-06-21 DIAGNOSIS — Z98.890 OTHER SPECIFIED POSTPROCEDURAL STATES: Chronic | ICD-10-CM

## 2021-06-21 PROCEDURE — 72040 X-RAY EXAM NECK SPINE 2-3 VW: CPT | Mod: 26

## 2021-06-21 PROCEDURE — 99024 POSTOP FOLLOW-UP VISIT: CPT

## 2021-06-21 PROCEDURE — 72040 X-RAY EXAM NECK SPINE 2-3 VW: CPT

## 2021-06-21 NOTE — ASSESSMENT
[FreeTextEntry1] : No Bend/Lift/Twist\par Do not lift anything more than 5 pounds for 2 weeks after surgery\par No strenuous activity for 2 weeks after surgery\par Do NOT smoke or use nicotine products as it can prevent bony fusion and delay healing\par Shower daily; use mild soap - pat incision line dry with dry separate towel\par Do not apply lotions or ointments over incision\par No tubs, pools for 6-8 weeks\par Avoid direct sun exposure to incision site for 3-6 months\par Gradually increase activities as tolerated\par \par Report all s/s infection including drainage, odor, redness, fever greater than 101,\par Wear hard cervical collar at all times.\par \par Cervical x-rays done at today's visit demonstrated mild increase in soft tissue swelling.\par Will start dexamethasone taper - instructions written down. Instructed to take steroids with food and take pepcid 20 mg daily.\par Return to the office in 2 weeks for wound check and evaluate progress.\par Notify office or report to ED immediately if swelling worsens, difficulty breathing/SOB\par \par Patient and patient's family verbalize agreement and understanding with plan of care.\par \par Dr. Richards present for visit and agrees with above plan. \par

## 2021-06-21 NOTE — HISTORY OF PRESENT ILLNESS
[de-identified] : 60 year old man with past medical history prostate cancer referred by Dr. Yang for neurosurgical evaluation of cervical spine.\par \par Mr. Montez states that he has suffered from chronic neck pain for about 15 years. Over this time, he states his symptoms have worsened. Pain involves his neck radiating to bilateral extremities (LEFT greater than RIGHT). He also reports intermittent numbness, particularly in his hands bilaterally. He reports weakness in his LEFT hand.\par \par He states pain is most severe with flexion/extension and rotation of his neck. For these reasons, he finds it difficult to participate in specific activities. He states even using the elliptical machine while working out can cause neck discomfort. \par He has done physical therapy in the past several times without relief of symptoms. He has not seen pain management nor had injections in his neck.\par \par He also reports flexion/extension of his neck also can cause vertigo and lightheadedness.\par \par He returned to the office on 3/22/21 with MRI cervical spine done on 3/10/21, which demonstrates large central disc herniation at C4/5 and moderate sized right paracentral disc herniation at C4/5 compressing the spinal cord.  EMG done on 3/17/21, demonstrates cervical radiculopathy.\par \mariela Continues to report intermittent severe neck pain and bilateral radiating arm pain (LEFT greater than RIGHT). \par He was recommended ACDF C3-4,C4-5. \par \par He comes to the office today to review surgical recommendations.

## 2021-06-21 NOTE — PHYSICAL EXAM
[General Appearance - Alert] : alert [General Appearance - In No Acute Distress] : in no acute distress [Clean] : clean [Dry] : dry [Intact] : intact [No Drainage] : without drainage [Normal Skin] : normal [Sclera] : the sclera and conjunctiva were normal [Oriented To Time, Place, And Person] : oriented to person, place, and time [Outer Ear] : the ears and nose were normal in appearance [] : no respiratory distress [Abnormal Walk] : normal gait [Skin Color & Pigmentation] : normal skin color and pigmentation [FreeTextEntry1] : Anterior neck [FreeTextEntry6] : small amount of edema near incision

## 2021-06-21 NOTE — REASON FOR VISIT
[de-identified] : 6/16/21 [de-identified] : ACDF C3-4, C4-5 [de-identified] : Reports he is doing well.\par Denies any signs of postop wound infection which could include but is not limited to redness/swelling/purulent drainage\par Denies CP/SOB/unilateral leg edema. Denies headaches, nausea, vomiting, dizziness, weakness. \par \par He reports  regurgitating saliva. Reports sore throat. Reports mild increase in swelling near incision since discharge. He has been wearing hard cervical collar. He denies difficulty swallowing and states he is able to swallow liquids. Denies SOB, difficulty breathing.\par He is currently not on steroids.\par

## 2021-06-22 DIAGNOSIS — M50.11 CERVICAL DISC DISORDER WITH RADICULOPATHY, HIGH CERVICAL REGION: ICD-10-CM

## 2021-06-22 DIAGNOSIS — M47.22 OTHER SPONDYLOSIS WITH RADICULOPATHY, CERVICAL REGION: ICD-10-CM

## 2021-06-22 DIAGNOSIS — E78.5 HYPERLIPIDEMIA, UNSPECIFIED: ICD-10-CM

## 2021-06-22 DIAGNOSIS — M54.12 RADICULOPATHY, CERVICAL REGION: ICD-10-CM

## 2021-06-22 DIAGNOSIS — Z90.79 ACQUIRED ABSENCE OF OTHER GENITAL ORGAN(S): ICD-10-CM

## 2021-06-22 DIAGNOSIS — R42 DIZZINESS AND GIDDINESS: ICD-10-CM

## 2021-06-22 DIAGNOSIS — K21.9 GASTRO-ESOPHAGEAL REFLUX DISEASE WITHOUT ESOPHAGITIS: ICD-10-CM

## 2021-06-22 DIAGNOSIS — M48.02 SPINAL STENOSIS, CERVICAL REGION: ICD-10-CM

## 2021-06-22 DIAGNOSIS — M50.01 CERVICAL DISC DISORDER WITH MYELOPATHY, HIGH CERVICAL REGION: ICD-10-CM

## 2021-06-22 DIAGNOSIS — Z85.46 PERSONAL HISTORY OF MALIGNANT NEOPLASM OF PROSTATE: ICD-10-CM

## 2021-06-25 ENCOUNTER — NON-APPOINTMENT (OUTPATIENT)
Age: 61
End: 2021-06-25

## 2021-06-28 ENCOUNTER — OUTPATIENT (OUTPATIENT)
Dept: OUTPATIENT SERVICES | Facility: HOSPITAL | Age: 61
LOS: 1 days | End: 2021-06-28
Payer: MEDICARE

## 2021-06-28 ENCOUNTER — APPOINTMENT (OUTPATIENT)
Dept: NEUROSURGERY | Facility: CLINIC | Age: 61
End: 2021-06-28
Payer: MEDICARE

## 2021-06-28 ENCOUNTER — RESULT REVIEW (OUTPATIENT)
Age: 61
End: 2021-06-28

## 2021-06-28 VITALS
WEIGHT: 183 LBS | OXYGEN SATURATION: 98 % | HEART RATE: 75 BPM | RESPIRATION RATE: 18 BRPM | SYSTOLIC BLOOD PRESSURE: 103 MMHG | DIASTOLIC BLOOD PRESSURE: 66 MMHG | BODY MASS INDEX: 26.2 KG/M2 | TEMPERATURE: 98 F | HEIGHT: 70 IN

## 2021-06-28 DIAGNOSIS — Z90.79 ACQUIRED ABSENCE OF OTHER GENITAL ORGAN(S): Chronic | ICD-10-CM

## 2021-06-28 DIAGNOSIS — Z98.890 OTHER SPECIFIED POSTPROCEDURAL STATES: Chronic | ICD-10-CM

## 2021-06-28 DIAGNOSIS — Z90.89 ACQUIRED ABSENCE OF OTHER ORGANS: Chronic | ICD-10-CM

## 2021-06-28 PROCEDURE — 72040 X-RAY EXAM NECK SPINE 2-3 VW: CPT

## 2021-06-28 PROCEDURE — 72040 X-RAY EXAM NECK SPINE 2-3 VW: CPT | Mod: 26

## 2021-06-28 PROCEDURE — 99024 POSTOP FOLLOW-UP VISIT: CPT

## 2021-06-29 NOTE — PHYSICAL EXAM
[General Appearance - Alert] : alert [General Appearance - In No Acute Distress] : in no acute distress [Clean] : clean [Dry] : dry [Intact] : intact [No Drainage] : without drainage [Normal Skin] : normal [Oriented To Time, Place, And Person] : oriented to person, place, and time [Motor Tone] : muscle tone was normal in all four extremities [Motor Strength] : muscle strength was normal in all four extremities [Sclera] : the sclera and conjunctiva were normal [Outer Ear] : the ears and nose were normal in appearance [Neck Appearance] : the appearance of the neck was normal [] : no respiratory distress [Abnormal Walk] : normal gait [Skin Color & Pigmentation] : normal skin color and pigmentation [FreeTextEntry1] : Anterior neck

## 2021-06-29 NOTE — REASON FOR VISIT
[de-identified] : ACDF C3-4, C4-5 [de-identified] : 6/16/21 [de-identified] : Reports he is doing well.\par Denies any signs of postop wound infection which could include but is not limited to redness/swelling/purulent drainage\par Denies CP/SOB/unilateral leg edema. Denies headaches, nausea, vomiting, dizziness, weakness. \par \par On prior visit, he reported regurgitating saliva and sore throat. He was started on steroids last week. He returns today for follow up. He reports improvement in swallowing and regurgitation. He has been compliant with hard cervical collar. \par

## 2021-06-29 NOTE — HISTORY OF PRESENT ILLNESS
[de-identified] : 60 year old man with past medical history prostate cancer referred by Dr. Yang for neurosurgical evaluation of cervical spine.\par \par Mr. Montez states that he has suffered from chronic neck pain for about 15 years. Over this time, he states his symptoms have worsened. Pain involves his neck radiating to bilateral extremities (LEFT greater than RIGHT). He also reports intermittent numbness, particularly in his hands bilaterally. He reports weakness in his LEFT hand.\par \par He states pain is most severe with flexion/extension and rotation of his neck. For these reasons, he finds it difficult to participate in specific activities. He states even using the elliptical machine while working out can cause neck discomfort. \par He has done physical therapy in the past several times without relief of symptoms. He has not seen pain management nor had injections in his neck.\par \par He also reports flexion/extension of his neck also can cause vertigo and lightheadedness.\par \par He returned to the office on 3/22/21 with MRI cervical spine done on 3/10/21, which demonstrates large central disc herniation at C4/5 and moderate sized right paracentral disc herniation at C4/5 compressing the spinal cord.  EMG done on 3/17/21, demonstrates cervical radiculopathy.\par \mariela Continues to report intermittent severe neck pain and bilateral radiating arm pain (LEFT greater than RIGHT). \par He was recommended ACDF C3-4,C4-5. \par \par He comes to the office today to review surgical recommendations.

## 2021-06-29 NOTE — ASSESSMENT
[FreeTextEntry1] : Cervical x-rays reviewed by Dr. Richards, alyse.\par No Bend/Lift/Twist\par Wear hard cervical collar at all times\par Do NOT smoke or use nicotine products as it can prevent bony fusion and delay healing\par Shower daily; use mild soap - pat incision line dry with dry separate towel\par Do not apply lotions or ointments over incision\par No tubs, pools for 6-8 weeks\par Avoid direct sun exposure to incision site for 3-6 months\par Gradually increase activities as tolerated\par \par Report all s/s infection including drainage, odor, redness, fever greater than 101,\par Will need CT cervical spine in 3 months (September 2021).\par \par Patient and patient's family verbalize agreement and understanding with plan of care.\par \par I, Dr. Richards, personally performed the evaluation and management (E/M) services for this established patient who presents today with (a) new problem(s)/exacerbation of (an) existing condition(s).  That E/M includes conducting the examination, assessing all new/exacerbated conditions, and establishing a new plan of care.  Today, my ACP, Ching Jean, was here to observe my evaluation and management services for this new problem/exacerbated condition to be followed going forward.\par \par

## 2021-09-21 ENCOUNTER — APPOINTMENT (OUTPATIENT)
Dept: CT IMAGING | Facility: HOSPITAL | Age: 61
End: 2021-09-21

## 2021-09-21 ENCOUNTER — OUTPATIENT (OUTPATIENT)
Dept: OUTPATIENT SERVICES | Facility: HOSPITAL | Age: 61
LOS: 1 days | End: 2021-09-21
Payer: MEDICARE

## 2021-09-21 DIAGNOSIS — Z90.79 ACQUIRED ABSENCE OF OTHER GENITAL ORGAN(S): Chronic | ICD-10-CM

## 2021-09-21 DIAGNOSIS — Z98.890 OTHER SPECIFIED POSTPROCEDURAL STATES: Chronic | ICD-10-CM

## 2021-09-21 DIAGNOSIS — Z90.89 ACQUIRED ABSENCE OF OTHER ORGANS: Chronic | ICD-10-CM

## 2021-09-21 PROCEDURE — G1004: CPT

## 2021-09-21 PROCEDURE — 72125 CT NECK SPINE W/O DYE: CPT | Mod: MG

## 2021-09-21 PROCEDURE — 72125 CT NECK SPINE W/O DYE: CPT | Mod: 26,MG

## 2021-09-24 ENCOUNTER — APPOINTMENT (OUTPATIENT)
Dept: NEUROSURGERY | Facility: CLINIC | Age: 61
End: 2021-09-24
Payer: MEDICARE

## 2021-09-24 VITALS
BODY MASS INDEX: 26.2 KG/M2 | WEIGHT: 183 LBS | DIASTOLIC BLOOD PRESSURE: 79 MMHG | TEMPERATURE: 97.1 F | HEIGHT: 70 IN | SYSTOLIC BLOOD PRESSURE: 119 MMHG | OXYGEN SATURATION: 98 % | RESPIRATION RATE: 14 BRPM | HEART RATE: 73 BPM

## 2021-09-24 PROCEDURE — 99214 OFFICE O/P EST MOD 30 MIN: CPT

## 2021-09-24 RX ORDER — DEXAMETHASONE 2 MG/1
2 TABLET ORAL
Qty: 38 | Refills: 0 | Status: DISCONTINUED | COMMUNITY
Start: 2021-06-21 | End: 2021-09-24

## 2021-09-24 RX ORDER — FAMOTIDINE 20 MG/1
20 TABLET, FILM COATED ORAL
Qty: 14 | Refills: 0 | Status: DISCONTINUED | COMMUNITY
Start: 2021-06-21 | End: 2021-09-24

## 2021-09-24 NOTE — PHYSICAL EXAM
[General Appearance - Alert] : alert [General Appearance - In No Acute Distress] : in no acute distress [Clean] : clean [Dry] : dry [Intact] : intact [Normal Skin] : normal [Oriented To Time, Place, And Person] : oriented to person, place, and time [Sclera] : the sclera and conjunctiva were normal [Outer Ear] : the ears and nose were normal in appearance [Neck Appearance] : the appearance of the neck was normal [Abnormal Walk] : normal gait [] : no respiratory distress [Skin Color & Pigmentation] : normal skin color and pigmentation [FreeTextEntry1] : RIGHT neck

## 2021-09-24 NOTE — ASSESSMENT
[FreeTextEntry1] : CT cervical spine done on 9/21/21 reviewed by Dr. Richards, demonstrates bony fusion.\par Can remove cervical collar\par Follow up in 6 months with new cervical x-rays (March 2022)\par \par Patient verbalizes agreement and understanding with plan of care.\par \par I, Dr. Richards, personally performed the evaluation and management (E/M) services for this established patient who presents today with (a) new problem(s)/exacerbation of (an) existing condition(s).  That E/M includes conducting the examination, assessing all new/exacerbated conditions, and establishing a new plan of care.  Today, my ACP, Ching Jean, was here to observe my evaluation and management services for this new problem/exacerbated condition to be followed going forward.\par \par

## 2022-01-10 ENCOUNTER — NON-APPOINTMENT (OUTPATIENT)
Age: 62
End: 2022-01-10

## 2022-03-11 ENCOUNTER — RESULT REVIEW (OUTPATIENT)
Age: 62
End: 2022-03-11

## 2022-03-11 ENCOUNTER — NON-APPOINTMENT (OUTPATIENT)
Age: 62
End: 2022-03-11

## 2022-03-11 ENCOUNTER — OUTPATIENT (OUTPATIENT)
Dept: OUTPATIENT SERVICES | Facility: HOSPITAL | Age: 62
LOS: 1 days | End: 2022-03-11
Payer: MEDICARE

## 2022-03-11 ENCOUNTER — APPOINTMENT (OUTPATIENT)
Dept: NEUROSURGERY | Facility: CLINIC | Age: 62
End: 2022-03-11
Payer: MEDICARE

## 2022-03-11 VITALS
WEIGHT: 183 LBS | HEART RATE: 80 BPM | TEMPERATURE: 98 F | DIASTOLIC BLOOD PRESSURE: 75 MMHG | OXYGEN SATURATION: 98 % | BODY MASS INDEX: 26.2 KG/M2 | SYSTOLIC BLOOD PRESSURE: 113 MMHG | HEIGHT: 70 IN | RESPIRATION RATE: 18 BRPM

## 2022-03-11 DIAGNOSIS — Z98.890 OTHER SPECIFIED POSTPROCEDURAL STATES: Chronic | ICD-10-CM

## 2022-03-11 DIAGNOSIS — Z90.79 ACQUIRED ABSENCE OF OTHER GENITAL ORGAN(S): Chronic | ICD-10-CM

## 2022-03-11 DIAGNOSIS — Z90.89 ACQUIRED ABSENCE OF OTHER ORGANS: Chronic | ICD-10-CM

## 2022-03-11 PROCEDURE — 72050 X-RAY EXAM NECK SPINE 4/5VWS: CPT

## 2022-03-11 PROCEDURE — 99214 OFFICE O/P EST MOD 30 MIN: CPT

## 2022-03-11 PROCEDURE — 72050 X-RAY EXAM NECK SPINE 4/5VWS: CPT | Mod: 26

## 2022-03-11 NOTE — ASSESSMENT
[FreeTextEntry1] : 3/11 cervical xray flex/ext reviewed by Dr. Richards with the paitent that shows a stable spine with good bony fusion and intact screws.\par \par Plan:\par 1. Repeat cervical xray in 1 year in March 2023 and schedule folllow up with Dr. Richards.\par 2. Call for new symptoms.\par \par \par \par I, Dr. Richards, personally performed the evaluation and management (E/M) services for this established patient who presents today with (a) new problem(s)/exacerbation of (an) existing condition(s). That E/M includes conducting the examination, assessing all new/exacerbated conditions, and establishing a new plan of care. Today, my ACP, Carlie Crawford, was here to observe my evaluation and management services for this new problem/exacerbated condition to be followed going forward.\par \par

## 2022-03-11 NOTE — REASON FOR VISIT
[Follow-Up: _____] : a [unfilled] follow-up visit [FreeTextEntry1] : to review 6 mo cervical xray s/p ACDF C3-4, C4-5 on 6/16/21.

## 2022-03-11 NOTE — HISTORY OF PRESENT ILLNESS
[FreeTextEntry1] : 61 year old man with past medical history prostate cancer referred by Dr. Yang for neurosurgical evaluation of cervical spine.\par \par Mr. Montez states that he has suffered from chronic neck pain for about 15 years. Over this time, he states his symptoms have worsened. Pain involves his neck radiating to bilateral extremities (LEFT greater than RIGHT). He also reports intermittent numbness, particularly in his hands bilaterally. He reports weakness in his LEFT hand.\par \par He states pain is most severe with flexion/extension and rotation of his neck. For these reasons, he finds it difficult to participate in specific activities. He states even using the elliptical machine while working out can cause neck discomfort. \par He has done physical therapy in the past several times without relief of symptoms. He has not seen pain management nor had injections in his neck.\par \par He also reports flexion/extension of his neck also can cause vertigo and lightheadedness.\par \par He returned to the office on 3/22/21 with MRI cervical spine done on 3/10/21, which demonstrates large central disc herniation at C4/5 and moderate sized right paracentral disc herniation at C4/5 compressing the spinal cord. EMG done on 3/17/21, demonstrates cervical radiculopathy.\par \par He underwent ACDF C3-4, C4-5 on 6/16/21.\par \par Follow up CT cervical spine done on 9/21/21 showed bony fusion. Cleared to remove cervical collar.\par Had been feeling well. He has been compliant wearing his hard cervical collar. Reported occasional neck stiffness and mild neck pain. Denied numbness/tingling/weakness. Advised to follow up in 6 months (March 2022) with a new cervical xray.\par \par Today, he presents to review 6 month cervical xray. He reports some residual neck stiffness in which he gets physical therapy through the [facility]. He is able to flex and extend his neck. Denies arm/hand neuropathy. Surgical swelling subsided. Overall feeling better post surgery.\par \par \par \par

## 2022-03-11 NOTE — PHYSICAL EXAM
[General Appearance - Alert] : alert [General Appearance - In No Acute Distress] : in no acute distress [Oriented To Time, Place, And Person] : oriented to person, place, and time [Impaired Insight] : insight and judgment were intact [Cranial Nerves Optic (II)] : visual acuity intact bilaterally,  pupils equal round and reactive to light [Cranial Nerves Oculomotor (III)] : extraocular motion intact [Cranial Nerves Trigeminal (V)] : facial sensation intact symmetrically [Cranial Nerves Facial (VII)] : face symmetrical [Cranial Nerves Vestibulocochlear (VIII)] : hearing was intact bilaterally [Cranial Nerves Glossopharyngeal (IX)] : tongue and palate midline [Cranial Nerves Accessory (XI - Cranial And Spinal)] : head turning and shoulder shrug symmetric [Cranial Nerves Hypoglossal (XII)] : there was no tongue deviation with protrusion [Neck Appearance] : the appearance of the neck was normal [] : no respiratory distress [Respiration, Rhythm And Depth] : normal respiratory rhythm and effort [FreeTextEntry1] : anterior neck surgical incision well healed [FreeTextEntry6] : 5/5 on BL UE

## 2022-06-24 ENCOUNTER — NON-APPOINTMENT (OUTPATIENT)
Age: 62
End: 2022-06-24

## 2022-07-13 ENCOUNTER — APPOINTMENT (OUTPATIENT)
Dept: MRI IMAGING | Facility: HOSPITAL | Age: 62
End: 2022-07-13

## 2022-07-13 ENCOUNTER — OUTPATIENT (OUTPATIENT)
Dept: OUTPATIENT SERVICES | Facility: HOSPITAL | Age: 62
LOS: 1 days | End: 2022-07-13
Payer: MEDICARE

## 2022-07-13 DIAGNOSIS — Z98.890 OTHER SPECIFIED POSTPROCEDURAL STATES: Chronic | ICD-10-CM

## 2022-07-13 DIAGNOSIS — Z90.89 ACQUIRED ABSENCE OF OTHER ORGANS: Chronic | ICD-10-CM

## 2022-07-13 DIAGNOSIS — Z90.79 ACQUIRED ABSENCE OF OTHER GENITAL ORGAN(S): Chronic | ICD-10-CM

## 2022-07-13 PROCEDURE — 72141 MRI NECK SPINE W/O DYE: CPT

## 2022-07-13 PROCEDURE — 72141 MRI NECK SPINE W/O DYE: CPT | Mod: 26,MH

## 2022-07-15 ENCOUNTER — OUTPATIENT (OUTPATIENT)
Dept: OUTPATIENT SERVICES | Facility: HOSPITAL | Age: 62
LOS: 1 days | End: 2022-07-15
Payer: MEDICARE

## 2022-07-15 ENCOUNTER — APPOINTMENT (OUTPATIENT)
Dept: NEUROSURGERY | Facility: CLINIC | Age: 62
End: 2022-07-15

## 2022-07-15 ENCOUNTER — NON-APPOINTMENT (OUTPATIENT)
Age: 62
End: 2022-07-15

## 2022-07-15 ENCOUNTER — RESULT REVIEW (OUTPATIENT)
Age: 62
End: 2022-07-15

## 2022-07-15 VITALS
HEIGHT: 70 IN | HEART RATE: 71 BPM | BODY MASS INDEX: 26.2 KG/M2 | DIASTOLIC BLOOD PRESSURE: 72 MMHG | OXYGEN SATURATION: 98 % | TEMPERATURE: 97 F | RESPIRATION RATE: 18 BRPM | SYSTOLIC BLOOD PRESSURE: 116 MMHG | WEIGHT: 183 LBS

## 2022-07-15 DIAGNOSIS — Z98.890 OTHER SPECIFIED POSTPROCEDURAL STATES: Chronic | ICD-10-CM

## 2022-07-15 DIAGNOSIS — M54.2 CERVICALGIA: ICD-10-CM

## 2022-07-15 DIAGNOSIS — H93.13 TINNITUS, BILATERAL: ICD-10-CM

## 2022-07-15 DIAGNOSIS — Z90.89 ACQUIRED ABSENCE OF OTHER ORGANS: Chronic | ICD-10-CM

## 2022-07-15 DIAGNOSIS — Z98.1 ARTHRODESIS STATUS: ICD-10-CM

## 2022-07-15 DIAGNOSIS — Z90.79 ACQUIRED ABSENCE OF OTHER GENITAL ORGAN(S): Chronic | ICD-10-CM

## 2022-07-15 DIAGNOSIS — R20.2 PARESTHESIA OF SKIN: ICD-10-CM

## 2022-07-15 PROCEDURE — 72052 X-RAY EXAM NECK SPINE 6/>VWS: CPT | Mod: 26

## 2022-07-15 PROCEDURE — 72052 X-RAY EXAM NECK SPINE 6/>VWS: CPT

## 2022-07-15 PROCEDURE — 99214 OFFICE O/P EST MOD 30 MIN: CPT

## 2022-07-15 RX ORDER — MECLIZINE HYDROCHLORIDE 25 MG/1
25 TABLET ORAL
Qty: 28 | Refills: 0 | Status: ACTIVE | COMMUNITY
Start: 2022-07-15 | End: 1900-01-01

## 2022-07-16 PROBLEM — Z98.1 S/P CERVICAL SPINAL FUSION: Status: ACTIVE | Noted: 2021-06-21

## 2022-07-16 PROBLEM — R20.2 RIGHT HAND PARESTHESIA: Status: ACTIVE | Noted: 2022-07-16

## 2022-07-16 PROBLEM — M54.2 NECK PAIN: Status: ACTIVE | Noted: 2020-12-21

## 2022-07-16 PROBLEM — H93.13 TINNITUS OF BOTH EARS: Status: ACTIVE | Noted: 2022-07-16

## 2022-07-18 ENCOUNTER — RESULT REVIEW (OUTPATIENT)
Age: 62
End: 2022-07-18

## 2022-07-20 ENCOUNTER — OUTPATIENT (OUTPATIENT)
Dept: OUTPATIENT SERVICES | Facility: HOSPITAL | Age: 62
LOS: 1 days | End: 2022-07-20
Payer: MEDICARE

## 2022-07-20 ENCOUNTER — APPOINTMENT (OUTPATIENT)
Dept: CT IMAGING | Facility: HOSPITAL | Age: 62
End: 2022-07-20

## 2022-07-20 DIAGNOSIS — Z98.890 OTHER SPECIFIED POSTPROCEDURAL STATES: Chronic | ICD-10-CM

## 2022-07-20 DIAGNOSIS — Z90.89 ACQUIRED ABSENCE OF OTHER ORGANS: Chronic | ICD-10-CM

## 2022-07-20 DIAGNOSIS — Z90.79 ACQUIRED ABSENCE OF OTHER GENITAL ORGAN(S): Chronic | ICD-10-CM

## 2022-07-20 PROCEDURE — 72125 CT NECK SPINE W/O DYE: CPT | Mod: 26,ME

## 2022-07-20 PROCEDURE — G1004: CPT

## 2022-07-20 PROCEDURE — 72125 CT NECK SPINE W/O DYE: CPT | Mod: ME

## 2022-07-22 ENCOUNTER — OUTPATIENT (OUTPATIENT)
Dept: OUTPATIENT SERVICES | Facility: HOSPITAL | Age: 62
LOS: 1 days | End: 2022-07-22
Payer: MEDICARE

## 2022-07-22 ENCOUNTER — APPOINTMENT (OUTPATIENT)
Dept: NEUROSURGERY | Facility: CLINIC | Age: 62
End: 2022-07-22

## 2022-07-22 ENCOUNTER — APPOINTMENT (OUTPATIENT)
Dept: MRI IMAGING | Facility: HOSPITAL | Age: 62
End: 2022-07-22

## 2022-07-22 VITALS
WEIGHT: 183 LBS | TEMPERATURE: 97 F | HEIGHT: 70 IN | SYSTOLIC BLOOD PRESSURE: 116 MMHG | DIASTOLIC BLOOD PRESSURE: 64 MMHG | OXYGEN SATURATION: 98 % | RESPIRATION RATE: 18 BRPM | HEART RATE: 66 BPM | BODY MASS INDEX: 26.2 KG/M2

## 2022-07-22 DIAGNOSIS — Z90.89 ACQUIRED ABSENCE OF OTHER ORGANS: Chronic | ICD-10-CM

## 2022-07-22 DIAGNOSIS — Z90.79 ACQUIRED ABSENCE OF OTHER GENITAL ORGAN(S): Chronic | ICD-10-CM

## 2022-07-22 DIAGNOSIS — Z98.890 OTHER SPECIFIED POSTPROCEDURAL STATES: Chronic | ICD-10-CM

## 2022-07-22 PROCEDURE — A9585: CPT

## 2022-07-22 PROCEDURE — 70553 MRI BRAIN STEM W/O & W/DYE: CPT

## 2022-07-22 PROCEDURE — 99214 OFFICE O/P EST MOD 30 MIN: CPT

## 2022-07-22 PROCEDURE — 70553 MRI BRAIN STEM W/O & W/DYE: CPT | Mod: 26,MH

## 2022-07-24 NOTE — DATA REVIEWED
[de-identified] : [facility]\par    [facility] Department of Radiology\par   Radiology Report\par \par \par Patient Name: YUE GREEN  Report Date: 15-Jul-2022 12:57.00 \par Patient ID: [id number] ([id number]), [id number] ([id number])  Accession No.: [id number] \par Patient Birth Date: 1960  Report Status: F \par Referring Physician: [id number] JAYLEEN ACKERMAN   Reason For Study: Cervical Radiculopathy; Cervical radiculopathy, no red flags  \par \par \par \par \par ACC: [id number] EXAM: MR SPINE CERVICAL\par \par PROCEDURE DATE: 07/13/2022\par \par \par \par INTERPRETATION: PROCEDURE: MRI cervical spine without contrast\par \par INDICATION: Cervical radiculopathy, follow-up after cervical fusion.\par \par TECHNIQUE: Sagittal T1, T2, STIR and axial T1, T2 and gradient echo images of the cervical spine were obtained.\par \par COMPARISON: Preoperative MRI spine from 03/10/2021, and postoperative CT from 09/21/2021\par \par FINDINGS:\par \par As seen on the interval CT exam from September 2021, the patient is status post anterior discectomy and fusion at the C3-4 and C4-5 levels using stand-alone spacer cage and screw devices. There is excessive susceptibility streak artifact that obscures the spinal canal particularly on sagittal imaging. Axial images show patency of the spinal canal but nondiagnostic view of the spinal cord at the operative levels. However, spinal cord above and below the surgical levels appears preserved in caliber and signal. Despite distortion and as best appreciated on the axial T1 series, no cord compression or high-grade stenosis is apparent.\par \par Cervical vertebral bodies are grossly preserved in height, although there is poor visualization of the C4-6 vertebral bodies. The STIR images also are notably limited without obvious marrow edema signal at the nonoperative levels. No gross prevertebral swelling or fluid collection, although again anatomy is obscured by metallic artifact.\par \par Alignment of the cervical spine shows straightening lordosis, similar to the interval CT scan. 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 Without significant spinal stenosis. Bilateral neural foraminal narrowing in the presence of uncinate spurring is not well identified on MRI as well due to artifact.\par \par \par IMPRESSION:\par \par Compared to prior MRI from March 2021, the patient is status post ACDF C3-5. Unfortunately, MRI is essentially nondiagnostic due to susceptibility artifact. On limited imaging, no gladys cord compression identified.\par \par --- End of Report ---\par \par \par \par \par \par KSENIA RIOS MD; Attending Radiologist\par This document has been electronically signed. Jul 15 2022 12:57PM. \par  [de-identified] : 7/15/22 Cervical Xray flex/ext from [facility]: intact hardware

## 2022-07-24 NOTE — PHYSICAL EXAM
[General Appearance - Alert] : alert [General Appearance - In No Acute Distress] : in no acute distress [Oriented To Time, Place, And Person] : oriented to person, place, and time [Impaired Insight] : insight and judgment were intact [Affect] : the affect was normal [Cranial Nerves Optic (II)] : visual acuity intact bilaterally,  pupils equal round and reactive to light [Cranial Nerves Oculomotor (III)] : extraocular motion intact [Cranial Nerves Trigeminal (V)] : facial sensation intact symmetrically [Cranial Nerves Facial (VII)] : face symmetrical [Cranial Nerves Vestibulocochlear (VIII)] : hearing was intact bilaterally [Cranial Nerves Glossopharyngeal (IX)] : tongue and palate midline [Cranial Nerves Accessory (XI - Cranial And Spinal)] : head turning and shoulder shrug symmetric [Cranial Nerves Hypoglossal (XII)] : there was no tongue deviation with protrusion [Motor Tone] : muscle tone was normal in all four extremities [Motor Strength] : muscle strength was normal in all four extremities [Abnormal Walk] : normal gait [Balance] : balance was intact [Neck Appearance] : the appearance of the neck was normal [] : no respiratory distress [Respiration, Rhythm And Depth] : normal respiratory rhythm and effort [FreeTextEntry6] : 5/5 on all four extremities, equal hand grasps

## 2022-07-24 NOTE — HISTORY OF PRESENT ILLNESS
[FreeTextEntry1] : 61 year old man with past medical history prostate cancer referred by Dr. Yang for neurosurgical evaluation of cervical spine.\par \par Mr. Montez states that he has suffered from chronic neck pain for about 15 years. Over this time, he states his symptoms have worsened. Pain involves his neck radiating to bilateral extremities (LEFT greater than RIGHT). He also reports intermittent numbness, particularly in his hands bilaterally. He reports weakness in his LEFT hand.\par \par He states pain is most severe with flexion/extension and rotation of his neck. For these reasons, he finds it difficult to participate in specific activities. He states even using the elliptical machine while working out can cause neck discomfort. \par He has done physical therapy in the past several times without relief of symptoms. He has not seen pain management nor had injections in his neck.\par \par He also reports flexion/extension of his neck also can cause vertigo and lightheadedness.\par \par He returned to the office on 3/22/21 with MRI cervical spine done on 3/10/21, which demonstrates large central disc herniation at C4/5 and moderate sized right paracentral disc herniation at C4/5 compressing the spinal cord. EMG done on 3/17/21, demonstrates cervical radiculopathy.\par \par He underwent ACDF C3-4, C4-5 on 6/16/21.\par \par Follow up CT cervical spine done on 9/21/21 showed bony fusion. Cleared to remove cervical collar.\par Had been feeling well. He has been compliant wearing his hard cervical collar. Reported occasional neck stiffness and mild neck pain. Denied numbness/tingling/weakness. Advised to follow up in 6 months (March 2022) with a new cervical xray.\par \par At prior visit, he reports some residual neck stiffness in which he gets physical therapy through the [facility]. He is able to flex and extend his neck. Denies arm/hand neuropathy. Surgical swelling subsided. Overall feeling better post surgery.\par \par He called reporting worsening symptoms including neck pain and RIGHT fingertip tingling except the thumb within the last 6 months, as well as dizziness on neck flexion, extension and when laying flat within the past month.\par He denies upper extremity weakness or hand weakness. Denies trouble swallowing, LOC. \par He reports ringing of bilateral ears.\par \par He had MRI cervical spine done and returns to the office today to review. \par \par \par

## 2022-07-24 NOTE — ASSESSMENT
[FreeTextEntry1] : Patient s/p ACDF C3-4, C4-5 on 6/2021 presenting back with worsening neck pain and dizziness on neck flex/ext and laying flat with reports of b/l tinnitus. Grossly normal neurological exam. MRI cervical from 7/13/22 difficult to assess for cord compression 2/2 susceptibility artifact. A 7/15/22 xray cervical flex/ext obtained and reviewed by Dr. Richards that shows intact hardware.\par \par At this time, we do not think that the vertigo-like symptoms are related to his cervical spine.\par \par A MRI brain w/wo contrast recommended to assess for other pathologies causing dizziness and CT cervical wo contrast for better evaluation of cervical spine s/p ACDF.\par \par Will rx Meclizine for dizziness. He will also see an ENT doctor\par \par He is to follow up with Dr. Richards after both images have been completed.\par \par \par

## 2023-07-10 NOTE — ASSESSMENT
[FreeTextEntry1] : Patient s/p ACDF C3-4, C4-5 on 6/2021. He is no longer dizzy s/p Epley/Bas-Ftft-Euxs maneuvers with ENT. He can continue meclizine. No concerning findings on MRI brain with and without contrast.\par CT cervical spine demonstrates good bony fusion of ACDF. He should return in 1 year with cervical spine xrays. \par \par

## 2023-07-10 NOTE — PHYSICAL EXAM
[General Appearance - Alert] : alert [Oriented To Time, Place, And Person] : oriented to person, place, and time [General Appearance - In No Acute Distress] : in no acute distress [Impaired Insight] : insight and judgment were intact [Affect] : the affect was normal [Cranial Nerves Optic (II)] : visual acuity intact bilaterally,  pupils equal round and reactive to light [Cranial Nerves Oculomotor (III)] : extraocular motion intact [Cranial Nerves Trigeminal (V)] : facial sensation intact symmetrically [Cranial Nerves Facial (VII)] : face symmetrical [Cranial Nerves Vestibulocochlear (VIII)] : hearing was intact bilaterally [Cranial Nerves Glossopharyngeal (IX)] : tongue and palate midline [Cranial Nerves Accessory (XI - Cranial And Spinal)] : head turning and shoulder shrug symmetric [Cranial Nerves Hypoglossal (XII)] : there was no tongue deviation with protrusion [Motor Tone] : muscle tone was normal in all four extremities [Motor Strength] : muscle strength was normal in all four extremities [Abnormal Walk] : normal gait [Balance] : balance was intact [] : no respiratory distress [Neck Appearance] : the appearance of the neck was normal [Respiration, Rhythm And Depth] : normal respiratory rhythm and effort [FreeTextEntry6] : 5/5 on all four extremities, equal hand grasps

## 2023-07-10 NOTE — HISTORY OF PRESENT ILLNESS
[FreeTextEntry1] : Here to followup recent dizziness. ENT evaluated and diagnosis appears to be benign positional vertigo. Comes with new brain MRI.\par \par 61 year old man with past medical history prostate cancer referred by Dr. Yang for neurosurgical evaluation of cervical spine.\par \par Mr. Montez states that he has suffered from chronic neck pain for about 15 years. Over this time, he states his symptoms have worsened. Pain involves his neck radiating to bilateral extremities (LEFT greater than RIGHT). He also reports intermittent numbness, particularly in his hands bilaterally. He reports weakness in his LEFT hand.\par \par He states pain is most severe with flexion/extension and rotation of his neck. For these reasons, he finds it difficult to participate in specific activities. He states even using the [employer] machine while working out can cause neck discomfort. \par He has done physical therapy in the past several times without relief of symptoms. He has not seen pain management nor had injections in his neck.\par \par He also reports flexion/extension of his neck also can cause vertigo and lightheadedness.\par \par He returned to the office on 3/22/21 with MRI cervical spine done on 3/10/21, which demonstrates large central disc herniation at C4/5 and moderate sized right paracentral disc herniation at C4/5 compressing the spinal cord. EMG done on 3/17/21, demonstrates cervical radiculopathy.\par \par He underwent ACDF C3-4, C4-5 on 6/16/21.\par \par He reports ringing of bilateral ears for the last 5-6 weeks along with non-positional dizziness. \par \par He saw an ENT Dr. Raymundo Monday at [place] who diagnosed him with BPPV and performed an Epley maneuver, he has felt somewhat better since then. He continues on meclizine. \par He had MRI cervical spine and MRI brain  done and returns to the office today to review. \par \par \par

## 2023-07-10 NOTE — ASSESSMENT
[FreeTextEntry1] : Patient s/p ACDF C3-4, C4-5 on 6/2021. He is no longer dizzy s/p Epley/Awu-Nqcj-Efql maneuvers with ENT. He can continue meclizine. No concerning findings on MRI brain with and without contrast.\par CT cervical spine demonstrates good bony fusion of ACDF. He should return in 1 year with cervical spine xrays. \par \par

## 2023-07-10 NOTE — PHYSICAL EXAM
[General Appearance - Alert] : alert [Oriented To Time, Place, And Person] : oriented to person, place, and time [General Appearance - In No Acute Distress] : in no acute distress [Impaired Insight] : insight and judgment were intact [Affect] : the affect was normal [Cranial Nerves Optic (II)] : visual acuity intact bilaterally,  pupils equal round and reactive to light [Cranial Nerves Oculomotor (III)] : extraocular motion intact [Cranial Nerves Trigeminal (V)] : facial sensation intact symmetrically [Cranial Nerves Facial (VII)] : face symmetrical [Cranial Nerves Vestibulocochlear (VIII)] : hearing was intact bilaterally [Cranial Nerves Glossopharyngeal (IX)] : tongue and palate midline [Cranial Nerves Accessory (XI - Cranial And Spinal)] : head turning and shoulder shrug symmetric [Cranial Nerves Hypoglossal (XII)] : there was no tongue deviation with protrusion [Motor Tone] : muscle tone was normal in all four extremities [Motor Strength] : muscle strength was normal in all four extremities [Abnormal Walk] : normal gait [Balance] : balance was intact [Neck Appearance] : the appearance of the neck was normal [] : no respiratory distress [Respiration, Rhythm And Depth] : normal respiratory rhythm and effort [FreeTextEntry6] : 5/5 on all four extremities, equal hand grasps

## 2023-07-10 NOTE — HISTORY OF PRESENT ILLNESS
[FreeTextEntry1] : Here to followup recent dizziness. ENT evaluated and diagnosis appears to be benign positional vertigo. Comes with new brain MRI.\par \par 61 year old man with past medical history prostate cancer referred by Dr. Yang for neurosurgical evaluation of cervical spine.\par \par Mr. Montez states that he has suffered from chronic neck pain for about 15 years. Over this time, he states his symptoms have worsened. Pain involves his neck radiating to bilateral extremities (LEFT greater than RIGHT). He also reports intermittent numbness, particularly in his hands bilaterally. He reports weakness in his LEFT hand.\par \par He states pain is most severe with flexion/extension and rotation of his neck. For these reasons, he finds it difficult to participate in specific activities. He states even using the [employer] machine while working out can cause neck discomfort. \par He has done physical therapy in the past several times without relief of symptoms. He has not seen pain management nor had injections in his neck.\par \par He also reports flexion/extension of his neck also can cause vertigo and lightheadedness.\par \par He returned to the office on 3/22/21 with MRI cervical spine done on 3/10/21, which demonstrates large central disc herniation at C4/5 and moderate sized right paracentral disc herniation at C4/5 compressing the spinal cord. EMG done on 3/17/21, demonstrates cervical radiculopathy.\par \par He underwent ACDF C3-4, C4-5 on 6/16/21.\par \par He reports ringing of bilateral ears for the last 5-6 weeks along with non-positional dizziness. \par \par He saw an ENT Dr. Raymundo Monday at [place] who diagnosed him with BPPV and performed an Epley maneuver, he has felt somewhat better since then. He continues on meclizine. \par He had MRI cervical spine and MRI brain  done and returns to the office today to review. \par \par \par

## 2023-07-12 PROBLEM — M54.12 CERVICAL RADICULOPATHY: Status: ACTIVE | Noted: 2020-12-22

## 2023-07-12 PROBLEM — M48.02 DEGENERATIVE CERVICAL SPINAL STENOSIS: Status: ACTIVE | Noted: 2020-12-22

## 2023-07-12 PROBLEM — R42 DIZZINESS: Status: ACTIVE | Noted: 2022-07-15

## 2023-07-12 PROBLEM — M48.02 CERVICAL SPINAL STENOSIS: Status: ACTIVE | Noted: 2020-12-21

## 2023-07-14 ENCOUNTER — RESULT REVIEW (OUTPATIENT)
Age: 63
End: 2023-07-14

## 2023-07-14 ENCOUNTER — OUTPATIENT (OUTPATIENT)
Dept: OUTPATIENT SERVICES | Facility: HOSPITAL | Age: 63
LOS: 1 days | End: 2023-07-14
Payer: COMMERCIAL

## 2023-07-14 ENCOUNTER — APPOINTMENT (OUTPATIENT)
Dept: NEUROSURGERY | Facility: CLINIC | Age: 63
End: 2023-07-14
Payer: MEDICARE

## 2023-07-14 VITALS
HEIGHT: 70 IN | WEIGHT: 179.99 LBS | OXYGEN SATURATION: 97 % | SYSTOLIC BLOOD PRESSURE: 98 MMHG | BODY MASS INDEX: 25.77 KG/M2 | DIASTOLIC BLOOD PRESSURE: 70 MMHG | HEART RATE: 68 BPM | TEMPERATURE: 97.1 F

## 2023-07-14 DIAGNOSIS — Z90.89 ACQUIRED ABSENCE OF OTHER ORGANS: Chronic | ICD-10-CM

## 2023-07-14 DIAGNOSIS — Z98.890 OTHER SPECIFIED POSTPROCEDURAL STATES: Chronic | ICD-10-CM

## 2023-07-14 DIAGNOSIS — M48.02 SPINAL STENOSIS, CERVICAL REGION: ICD-10-CM

## 2023-07-14 DIAGNOSIS — M54.12 RADICULOPATHY, CERVICAL REGION: ICD-10-CM

## 2023-07-14 DIAGNOSIS — Z90.79 ACQUIRED ABSENCE OF OTHER GENITAL ORGAN(S): Chronic | ICD-10-CM

## 2023-07-14 DIAGNOSIS — R42 DIZZINESS AND GIDDINESS: ICD-10-CM

## 2023-07-14 PROCEDURE — 72050 X-RAY EXAM NECK SPINE 4/5VWS: CPT

## 2023-07-14 PROCEDURE — 72050 X-RAY EXAM NECK SPINE 4/5VWS: CPT | Mod: 26

## 2023-07-14 PROCEDURE — 99214 OFFICE O/P EST MOD 30 MIN: CPT

## 2023-07-15 NOTE — ADDENDUM
[FreeTextEntry1] : Doing well. X-ray shows no cervical instability, hardware in good position. No further radicular pain or paresthesias, minimal neck discomfort with strenuous activity. Followup X-ray in 3 years.

## 2023-07-15 NOTE — ASSESSMENT
[FreeTextEntry1] : I, Dr. Richards, personally performed the evaluation and management (E/M) services for this established patient who presents today with (a) new problem(s)/exacerbation of (an) existing condition(s). That E/M includes conducting the examination, assessing all new/exacerbated conditions, and establishing a new plan of care. Today, my ACP, Kim Ramirez, was here to observe my evaluation and management services for this new problem/exacerbated condition to be followed going forward.\par \par Plan:\par - RTC in 3 years with cervical spine xray imaging\par - no restriction of weight lifting but avoid if he has neck pain

## 2023-07-15 NOTE — PHYSICAL EXAM
[General Appearance - Alert] : alert [General Appearance - Well Nourished] : well nourished [General Appearance - Well-Appearing] : healthy appearing [Person] : oriented to person [Place] : oriented to place [Time] : oriented to time [Motor Tone] : muscle tone was normal in all four extremities [Motor Strength] : muscle strength was normal in all four extremities [Involuntary Movements] : no involuntary movements were seen [Motor Handedness Right-Handed] : the patient is right hand dominant [Balance] : balance was intact [No Visual Abnormalities] : no visible abnormalities [Neck Appearance] : the appearance of the neck was normal [Abnormal Walk] : normal gait [FreeTextEntry5] : denies issues swallowing no dysphasia  [FreeTextEntry7] : denies neuropathies [FreeTextEntry1] : decreased ROM turning head right and left but denies pain

## 2023-07-15 NOTE — REASON FOR VISIT
[Follow-Up: _____] : a [unfilled] follow-up visit [FreeTextEntry1] : 61 year old man with past medical history prostate cancer who was referred by Dr. Yang for neurosurgical evaluation of cervical spine after suffering from chronic neck pain for about 15 years with worsening symptoms. Pre-op the pain involved his neck radiating to bilateral extremities (LEFT greater than RIGHT). He also reported intermittent numbness, particularly in his hands bilaterally. He reported weakness in his LEFT hand.\par \par He returned to the office on 3/22/21 with MRI cervical spine done on 3/10/21, which demonstrated a large central disc herniation at C4/5 and moderate sized right paracentral disc herniation at C4/5 compressing the spinal cord. EMG done on 3/17/21, demonstrated cervical radiculopathy.\par \par He underwent ACDF C3-4, C4-5 on 6/16/21.\par \par Post- op he reported ringing of bilateral ears along with non-positional dizziness. He saw an ENT Dr. Raymundo Monday at [place] who diagnosed him with BPPV and performed an Epley maneuver, he felt somewhat better after though he continued to take meclizine. \par Was seen in NSG clinic in 7/22 where new MRI cervical spine and MRI brain showed no concerning findings and CT cervical spine demonstrated good bony fusion of ACDF. The plan at that time was to return in 1 year with cervical spine xrays. \par \par July 14, 2023, the patient returns for a one year follow up with cervical spine xrays. Films look stable and he no longer reports radiculopathy or paresthesia.